# Patient Record
Sex: MALE | Race: WHITE
[De-identification: names, ages, dates, MRNs, and addresses within clinical notes are randomized per-mention and may not be internally consistent; named-entity substitution may affect disease eponyms.]

---

## 2020-01-29 PROBLEM — Z00.00 ENCOUNTER FOR PREVENTIVE HEALTH EXAMINATION: Status: ACTIVE | Noted: 2020-01-29

## 2020-01-30 ENCOUNTER — APPOINTMENT (OUTPATIENT)
Dept: HEMATOLOGY ONCOLOGY | Facility: CLINIC | Age: 35
End: 2020-01-30
Payer: COMMERCIAL

## 2020-01-30 VITALS
HEIGHT: 65 IN | SYSTOLIC BLOOD PRESSURE: 125 MMHG | TEMPERATURE: 98.3 F | DIASTOLIC BLOOD PRESSURE: 62 MMHG | RESPIRATION RATE: 18 BRPM | OXYGEN SATURATION: 98 % | BODY MASS INDEX: 20.66 KG/M2 | WEIGHT: 124 LBS | HEART RATE: 100 BPM

## 2020-01-30 DIAGNOSIS — Z78.9 OTHER SPECIFIED HEALTH STATUS: ICD-10-CM

## 2020-01-30 DIAGNOSIS — Z80.1 FAMILY HISTORY OF MALIGNANT NEOPLASM OF TRACHEA, BRONCHUS AND LUNG: ICD-10-CM

## 2020-01-30 DIAGNOSIS — Z80.7 FAMILY HISTORY OF OTHER MALIGNANT NEOPLASMS OF LYMPHOID, HEMATOPOIETIC AND RELATED TISSUES: ICD-10-CM

## 2020-01-30 DIAGNOSIS — Z80.3 FAMILY HISTORY OF MALIGNANT NEOPLASM OF BREAST: ICD-10-CM

## 2020-01-30 DIAGNOSIS — Z86.19 PERSONAL HISTORY OF OTHER INFECTIOUS AND PARASITIC DISEASES: ICD-10-CM

## 2020-01-30 DIAGNOSIS — F17.200 NICOTINE DEPENDENCE, UNSPECIFIED, UNCOMPLICATED: ICD-10-CM

## 2020-01-30 DIAGNOSIS — Z80.42 FAMILY HISTORY OF MALIGNANT NEOPLASM OF PROSTATE: ICD-10-CM

## 2020-01-30 PROCEDURE — 99205 OFFICE O/P NEW HI 60 MIN: CPT

## 2020-02-05 ENCOUNTER — APPOINTMENT (OUTPATIENT)
Dept: HEMATOLOGY ONCOLOGY | Facility: CLINIC | Age: 35
End: 2020-02-05
Payer: COMMERCIAL

## 2020-02-05 VITALS
OXYGEN SATURATION: 97 % | WEIGHT: 125 LBS | SYSTOLIC BLOOD PRESSURE: 114 MMHG | HEART RATE: 100 BPM | TEMPERATURE: 98.2 F | BODY MASS INDEX: 20.83 KG/M2 | RESPIRATION RATE: 18 BRPM | DIASTOLIC BLOOD PRESSURE: 69 MMHG | HEIGHT: 65 IN

## 2020-02-05 PROCEDURE — 99214 OFFICE O/P EST MOD 30 MIN: CPT

## 2020-02-11 ENCOUNTER — APPOINTMENT (OUTPATIENT)
Dept: HEMATOLOGY ONCOLOGY | Facility: CLINIC | Age: 35
End: 2020-02-11
Payer: COMMERCIAL

## 2020-02-11 VITALS
DIASTOLIC BLOOD PRESSURE: 72 MMHG | SYSTOLIC BLOOD PRESSURE: 126 MMHG | RESPIRATION RATE: 18 BRPM | HEIGHT: 65 IN | OXYGEN SATURATION: 97 % | BODY MASS INDEX: 20.49 KG/M2 | WEIGHT: 123 LBS | TEMPERATURE: 99.6 F | HEART RATE: 117 BPM

## 2020-02-11 PROCEDURE — 99214 OFFICE O/P EST MOD 30 MIN: CPT

## 2020-02-12 NOTE — HISTORY OF PRESENT ILLNESS
[de-identified] : This is a very pleasant 35-year-old gentleman with the below past medical history who was found to have clubbing and therefore was referred for pulmonary workup.  He did report a 30 pound weight loss over the last 2 years. He has had waxing and waning lymph node enlargement particularly the left axilla and had been seen by another hematologist. Given the waxing and waning nature it was advised that this be observed.  He was seen by Dr. Kitchen of pulmonary who felt that the above findings were consistent with a lymphoproliferative disorder. Arrangements were made for him to undergo a left axillary lymph node biopsy performed by Dr. Wallace on 1/24/2020. 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  The tumor cells were found to be positive for CD30, weakly positive for CD15, weakly positive for PAX-5, and Fascin.  In situ hybridization probes for GRACE were negative.  The biopsy sample tested negative for OCT-2, CD45, CD 79a, CD20, CD3, and BOB1.   He is now here to establish hematological/oncological care and further management recommendations. [de-identified] : Here today for an initial consultation for lymphoma referred by Dr. Kitchen. He is in the company of his father.  [0 - No Distress] : Distress Level: 0

## 2020-02-12 NOTE — ASSESSMENT
[FreeTextEntry1] : This is a very pleasant 35-year-old gentleman with a newly diagnosed nodular sclerosing classic Hodgkin lymphoma.  Clinically at this point he appears to be at least stage IIIB given the findings of lymphadenopathy on both sides of diaphragm.  Other laboratories also reveal anemia therefore concerning the for bone marrow involvement, and possibly upstaging to stage IV. I will obtain a PET for more accurate staging as well as a baseline for future PET directed therapy.   I will also obtain a bone marrow biopsy to determine if there was bone marrow involvement. Other hematologic workup is being sent at today's office visit.  I will obtain hepatitis B and C testing as well as HIV testing.  I have also discussed the possibility of infertility secondary to chemotherapy administration and have counseled him on sperm banking. He reports to me that he is not interested in undergoing this. I have asked him to take some time and consider this prior to making a decision.  He will also likely require pulmonary function testing and cardiology clearance for anticipated bleomycin and anthracycline chemotherapy use, respectively.  I have also counseled him on smoking cessation and have strongly encouraged him to do this and provide a plan at today's office visit.  Return in one week for followup and further recommendations.\par \par Thank you very much for allowing me to participate in this gentleman's medical care. Should you have any questions or concerns please do not hesitate to call me directly.

## 2020-02-12 NOTE — PHYSICAL EXAM
[Fully active, able to carry on all pre-disease performance without restriction] : Status 0 - Fully active, able to carry on all pre-disease performance without restriction [Normal] : affect appropriate [de-identified] : Left axillary lymphadenopathy noted.  An approximately 2 cm right inguinal lymph node was appreciated.

## 2020-02-12 NOTE — REVIEW OF SYSTEMS
[Night Sweats] : night sweats [Cough] : cough [Swollen Glands] : swollen glands [Negative] : Neurological [FreeTextEntry2] : 30lb weight loss over the past year.  [de-identified] : difficulty sleeping [de-identified] : right groin [FreeTextEntry1] : cats, melon

## 2020-02-12 NOTE — PHYSICAL EXAM
[Fully active, able to carry on all pre-disease performance without restriction] : Status 0 - Fully active, able to carry on all pre-disease performance without restriction [Normal] : affect appropriate [de-identified] : Left axillary lymphadenopathy noted.  An approximately 2 cm right inguinal lymph node was appreciated. [de-identified] : L

## 2020-02-12 NOTE — ASSESSMENT
[FreeTextEntry1] : This is a very pleasant 35-year-old gentleman with a newly diagnosed nodular sclerosing classic Hodgkin lymphoma.  Clinically at this point he appears to be at least stage IIIB given the findings of lymphadenopathy on both sides of diaphragm.  Other laboratories also reveal anemia therefore concerning the for bone marrow involvement, and possibly upstaging to stage IV.  PET scan performed on 1/31/2020 revealed abnormal bilateral cervical FDG avid lymphadenopathy, abnormal FDG avid adenopathy seen at the thoracic inlet/supraclavicular region bilaterally, enlarged FDG avid left axillary lymphadenopathy with extensive mediastinal and bilateral hilar abnormal FDG avid lymphadenopathy, right femoral and inguinal abnormal FDG avid hypermetabolic lymph nodes.\par I will also obtain a bone marrow biopsy to determine if there was bone marrow involvement.  I have discussed the possibility of infertility secondary to chemotherapy administration and have counseled him on sperm banking. He reports to me that he has  thought about it and is not interested in undergoing this.   He will require pulmonary function testing and cardiology clearance for anticipated bleomycin and anthracycline chemotherapy use, respectively.  He has been scheduled for port placement on Friday with Dr. Wallace.  I have counseled him on smoking cessation and have strongly encouraged him to do this and provide a plan at today's office visit.  Return in one week for followup and further recommendations.

## 2020-02-12 NOTE — REVIEW OF SYSTEMS
[Night Sweats] : night sweats [Cough] : cough [Swollen Glands] : swollen glands [Negative] : Neurological [FreeTextEntry2] : 30lb weight loss over the past year.  [de-identified] : difficulty sleeping [de-identified] : right groin [FreeTextEntry1] : cats, melon

## 2020-02-12 NOTE — HISTORY OF PRESENT ILLNESS
[de-identified] : This is a very pleasant 35-year-old gentleman with the below past medical history who was found to have clubbing and therefore was referred for pulmonary workup.  He did report a 30 pound weight loss over the last 2 years. He has had waxing and waning lymph node enlargement particularly the left axilla and had been seen by another hematologist. Given the waxing and waning nature it was advised that this be observed.  He was seen by Dr. Kitchen of pulmonary who felt that the above findings were consistent with a lymphoproliferative disorder. Arrangements were made for him to undergo a left axillary lymph node biopsy performed by Dr. Wallace on 1/24/2020. 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  The tumor cells were found to be positive for CD30, weakly positive for CD15, weakly positive for PAX-5, and Fascin.  In situ hybridization probes for GRACE were negative.  The biopsy sample tested negative for OCT-2, CD45, CD 79a, CD20, CD3, and BOB1.   He is now here to establish hematological/oncological care and further management recommendations. [de-identified] : Here today for an initial consultation for lymphoma referred by Dr. Kitchen. He is in the company of his father.  [0 - No Distress] : Distress Level: 0

## 2020-02-20 ENCOUNTER — APPOINTMENT (OUTPATIENT)
Dept: HEMATOLOGY ONCOLOGY | Facility: CLINIC | Age: 35
End: 2020-02-20

## 2020-02-25 ENCOUNTER — APPOINTMENT (OUTPATIENT)
Dept: HEMATOLOGY ONCOLOGY | Facility: CLINIC | Age: 35
End: 2020-02-25
Payer: COMMERCIAL

## 2020-02-25 VITALS
HEART RATE: 117 BPM | DIASTOLIC BLOOD PRESSURE: 65 MMHG | BODY MASS INDEX: 20.83 KG/M2 | HEIGHT: 64.96 IN | TEMPERATURE: 98.8 F | WEIGHT: 125 LBS | SYSTOLIC BLOOD PRESSURE: 119 MMHG | RESPIRATION RATE: 18 BRPM | OXYGEN SATURATION: 97 %

## 2020-02-25 PROCEDURE — 99214 OFFICE O/P EST MOD 30 MIN: CPT

## 2020-02-25 NOTE — REVIEW OF SYSTEMS
[Night Sweats] : night sweats [Cough] : cough [Swollen Glands] : swollen glands [Negative] : Respiratory [FreeTextEntry2] : 30lb weight loss over the past year.  [de-identified] : difficulty sleeping [de-identified] : right groin [FreeTextEntry1] : cats, melon

## 2020-02-25 NOTE — ASSESSMENT
[FreeTextEntry1] : This is a very pleasant 35-year-old gentleman with a newly diagnosed nodular sclerosing classic Hodgkin lymphoma.  Clinically at this point he appears to be at least stage IIIB given the findings of lymphadenopathy on both sides of diaphragm.  Other laboratories also reveal anemia therefore concerning the for bone marrow involvement, and possibly upstaging to stage IV.  PET scan performed on 1/31/2020 revealed abnormal bilateral cervical FDG avid lymphadenopathy, abnormal FDG avid adenopathy seen at the thoracic inlet/supraclavicular region bilaterally, enlarged FDG avid left axillary lymphadenopathy with extensive mediastinal and bilateral hilar abnormal FDG avid lymphadenopathy, right femoral and inguinal abnormal FDG avid hypermetabolic lymph nodes.\par I will also obtain a bone marrow biopsy to determine if there was bone marrow involvement.  I have discussed the possibility of infertility secondary to chemotherapy administration and have counseled him on sperm banking. He reports to me that he has  thought about it and is not interested in undergoing this.   I have spoken to Dr. Kitchen and he is clearing him for bleomycin use.   He has also been cleared by cardiology for anthracycline use.  I have counseled him on smoking cessation and have strongly encouraged him to do this and provide a plan at today's office visit.  Return in one week for followup and labs with C#1 starting today.  The benefits/risks and side effects were d/w the patient and he wishes to proceed.  Literature on the chemotherapeutic agents was provided.  He will be given allopurinol in the infusion center as he did not take it as instructed to do so.

## 2020-02-25 NOTE — HISTORY OF PRESENT ILLNESS
[0 - No Distress] : Distress Level: 0 [de-identified] : This is a very pleasant 35-year-old gentleman with the below past medical history who was found to have clubbing and therefore was referred for pulmonary workup.  He did report a 30 pound weight loss over the last 2 years. He has had waxing and waning lymph node enlargement particularly the left axilla and had been seen by another hematologist. Given the waxing and waning nature it was advised that this be observed.  He was seen by Dr. Kitchen of pulmonary who felt that the above findings were consistent with a lymphoproliferative disorder. Arrangements were made for him to undergo a left axillary lymph node biopsy performed by Dr. Wallace on 1/24/2020. 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  The tumor cells were found to be positive for CD30, weakly positive for CD15, weakly positive for PAX-5, and Fascin.  In situ hybridization probes for GRACE were negative.  The biopsy sample tested negative for OCT-2, CD45, CD 79a, CD20, CD3, and BOB1.   He is now here to establish hematological/oncological care and further management recommendations. [de-identified] : Here today for a follow up visit. Complains of pain to his left chest radiating laterally. He is due for treatment today.

## 2020-02-25 NOTE — PHYSICAL EXAM
[Fully active, able to carry on all pre-disease performance without restriction] : Status 0 - Fully active, able to carry on all pre-disease performance without restriction [Normal] : grossly intact [de-identified] : Left axillary lymphadenopathy noted.  An approximately 2 cm right inguinal lymph node was appreciated.

## 2020-03-03 ENCOUNTER — APPOINTMENT (OUTPATIENT)
Dept: HEMATOLOGY ONCOLOGY | Facility: CLINIC | Age: 35
End: 2020-03-03
Payer: COMMERCIAL

## 2020-03-03 VITALS
WEIGHT: 125 LBS | DIASTOLIC BLOOD PRESSURE: 71 MMHG | HEART RATE: 100 BPM | OXYGEN SATURATION: 97 % | TEMPERATURE: 98.2 F | RESPIRATION RATE: 18 BRPM | SYSTOLIC BLOOD PRESSURE: 121 MMHG | HEIGHT: 64.96 IN | BODY MASS INDEX: 20.83 KG/M2

## 2020-03-03 PROCEDURE — 99214 OFFICE O/P EST MOD 30 MIN: CPT

## 2020-03-03 NOTE — PHYSICAL EXAM
[Fully active, able to carry on all pre-disease performance without restriction] : Status 0 - Fully active, able to carry on all pre-disease performance without restriction [Normal] : affect appropriate [de-identified] : Left axillary lymphadenopathy noted.  An approximately 2 cm right inguinal lymph node was appreciated.

## 2020-03-03 NOTE — ASSESSMENT
[FreeTextEntry1] : This is a very pleasant 35-year-old gentleman with a newly diagnosed nodular sclerosing classic Hodgkin lymphoma.  Clinically at this point he appears to be at least stage IIIB given the findings of lymphadenopathy on both sides of diaphragm.  Other laboratories also reveal anemia therefore concerning the for bone marrow involvement, and possibly upstaging to stage IV.  PET scan performed on 1/31/2020 revealed abnormal bilateral cervical FDG avid lymphadenopathy, abnormal FDG avid adenopathy seen at the thoracic inlet/supraclavicular region bilaterally, enlarged FDG avid left axillary lymphadenopathy with extensive mediastinal and bilateral hilar abnormal FDG avid lymphadenopathy, right femoral and inguinal abnormal FDG avid hypermetabolic lymph nodes.\par I will also obtain a bone marrow biopsy to determine if there was bone marrow involvement.  I have discussed the possibility of infertility secondary to chemotherapy administration and have counseled him on sperm banking. He reports to me that he has  thought about it and is not interested in undergoing this.   I have spoken to Dr. Kitchen and he is clearing him for bleomycin use.   He has also been cleared by cardiology for anthracycline use.  I have counseled him on smoking cessation and have strongly encouraged him to do this and provide a plan at today's office visit.  Return in one week for followup and labs with C#1 D#15 due then.  Tolerating treatment well.

## 2020-03-03 NOTE — REVIEW OF SYSTEMS
[Night Sweats] : night sweats [Swollen Glands] : swollen glands [Cough] : cough [Negative] : Constitutional [FreeTextEntry2] : one episode of night sweats first night after chemo. [de-identified] : difficulty sleeping [FreeTextEntry6] : improved [FreeTextEntry7] : mild nausea, relieved by zofran, now resolved.  [FreeTextEntry1] : cats, melon [de-identified] : right groin

## 2020-03-03 NOTE — HISTORY OF PRESENT ILLNESS
[0 - No Distress] : Distress Level: 0 [de-identified] : This is a very pleasant 35-year-old gentleman with the below past medical history who was found to have clubbing and therefore was referred for pulmonary workup.  He did report a 30 pound weight loss over the last 2 years. He has had waxing and waning lymph node enlargement particularly the left axilla and had been seen by another hematologist. Given the waxing and waning nature it was advised that this be observed.  He was seen by Dr. Kitchen of pulmonary who felt that the above findings were consistent with a lymphoproliferative disorder. Arrangements were made for him to undergo a left axillary lymph node biopsy performed by Dr. Wallace on 1/24/2020. 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  The tumor cells were found to be positive for CD30, weakly positive for CD15, weakly positive for PAX-5, and Fascin.  In situ hybridization probes for GRACE were negative.  The biopsy sample tested negative for OCT-2, CD45, CD 79a, CD20, CD3, and BOB1.   He is now here to f/u with labs after having started ABVD chemotherapy on 2/25/2020. [de-identified] : Here today for a follow up visit, lymphoma. Due Tuesday for treatment. Feels well today.

## 2020-03-09 ENCOUNTER — APPOINTMENT (OUTPATIENT)
Dept: HEMATOLOGY ONCOLOGY | Facility: CLINIC | Age: 35
End: 2020-03-09
Payer: COMMERCIAL

## 2020-03-09 VITALS
TEMPERATURE: 97.6 F | HEART RATE: 83 BPM | RESPIRATION RATE: 18 BRPM | BODY MASS INDEX: 21.99 KG/M2 | WEIGHT: 132 LBS | HEIGHT: 64.96 IN | OXYGEN SATURATION: 99 % | SYSTOLIC BLOOD PRESSURE: 129 MMHG | DIASTOLIC BLOOD PRESSURE: 71 MMHG

## 2020-03-09 PROCEDURE — 99214 OFFICE O/P EST MOD 30 MIN: CPT

## 2020-03-09 NOTE — REVIEW OF SYSTEMS
[Cough] : cough [Swollen Glands] : swollen glands [Recent Change In Weight] : ~T recent weight change [Negative] : Heme/Lymph [FreeTextEntry2] : 7 lb weight gain, purposeful [FreeTextEntry6] : improved [FreeTextEntry7] : mild stomach ache following treatment, now resolved, zofran prn [de-identified] : improved sleep [de-identified] : significantly reduced  [FreeTextEntry1] : cats, melon

## 2020-03-09 NOTE — PHYSICAL EXAM
[Fully active, able to carry on all pre-disease performance without restriction] : Status 0 - Fully active, able to carry on all pre-disease performance without restriction [Normal] : affect appropriate [de-identified] : Prior left axillary lymphadenopathy and 2 cm right inguinal lymph node almost unappreciable.

## 2020-03-09 NOTE — ASSESSMENT
[FreeTextEntry1] : This is a very pleasant 35-year-old gentleman with a newly diagnosed nodular sclerosing classic Hodgkin lymphoma.  Clinically at this point he appears to be at least stage IIIB given the findings of lymphadenopathy on both sides of diaphragm.  Other laboratories also reveal anemia therefore concerning the for bone marrow involvement, and possibly upstaging to stage IV.  PET scan performed on 1/31/2020 revealed abnormal bilateral cervical FDG avid lymphadenopathy, abnormal FDG avid adenopathy seen at the thoracic inlet/supraclavicular region bilaterally, enlarged FDG avid left axillary lymphadenopathy with extensive mediastinal and bilateral hilar abnormal FDG avid lymphadenopathy, right femoral and inguinal abnormal FDG avid hypermetabolic lymph nodes.\par I will also obtain a bone marrow biopsy to determine if there was bone marrow involvement.  I have discussed the possibility of infertility secondary to chemotherapy administration and have counseled him on sperm banking. He reports to me that he has  thought about it and is not interested in undergoing this.   I have spoken to Dr. Kitchen and he is clearing him for bleomycin use.   He has also been cleared by cardiology for anthracycline use.  I have counseled him on smoking cessation and have strongly encouraged him to do this.   Return in one week for followup and labs with C#1 D#15 due tomorrow.  Tolerating treatment well.

## 2020-03-09 NOTE — HISTORY OF PRESENT ILLNESS
[0 - No Distress] : Distress Level: 0 [de-identified] : This is a very pleasant 35-year-old gentleman with the below past medical history who was found to have clubbing and therefore was referred for pulmonary workup.  He did report a 30 pound weight loss over the last 2 years. He has had waxing and waning lymph node enlargement particularly the left axilla and had been seen by another hematologist. Given the waxing and waning nature it was advised that this be observed.  He was seen by Dr. Kitchen of pulmonary who felt that the above findings were consistent with a lymphoproliferative disorder. Arrangements were made for him to undergo a left axillary lymph node biopsy performed by Dr. Wallace on 1/24/2020. 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  The tumor cells were found to be positive for CD30, weakly positive for CD15, weakly positive for PAX-5, and Fascin.  In situ hybridization probes for GRACE were negative.  The biopsy sample tested negative for OCT-2, CD45, CD 79a, CD20, CD3, and BOB1.   He is now here to f/u with labs after having started ABVD chemotherapy on 2/25/2020. [de-identified] : Here today for a follow up visit, lymphoma. Due tomorrow for treatment. Offers no new complaints. 7 lb purposeful weight gain.

## 2020-03-17 ENCOUNTER — APPOINTMENT (OUTPATIENT)
Dept: HEMATOLOGY ONCOLOGY | Facility: CLINIC | Age: 35
End: 2020-03-17
Payer: COMMERCIAL

## 2020-03-17 VITALS
TEMPERATURE: 98.4 F | RESPIRATION RATE: 18 BRPM | WEIGHT: 136 LBS | BODY MASS INDEX: 22.66 KG/M2 | DIASTOLIC BLOOD PRESSURE: 71 MMHG | OXYGEN SATURATION: 98 % | HEIGHT: 64.96 IN | SYSTOLIC BLOOD PRESSURE: 116 MMHG | HEART RATE: 97 BPM

## 2020-03-17 PROCEDURE — 99214 OFFICE O/P EST MOD 30 MIN: CPT

## 2020-03-17 NOTE — ASSESSMENT
[FreeTextEntry1] : This is a very pleasant 35-year-old gentleman with a newly diagnosed nodular sclerosing classic Hodgkin lymphoma.  Clinically at this point he appears to be at least stage IIIB given the findings of lymphadenopathy on both sides of diaphragm.  Other laboratories also reveal anemia therefore concerning the for bone marrow involvement, and possibly upstaging to stage IV.  PET scan performed on 1/31/2020 revealed abnormal bilateral cervical FDG avid lymphadenopathy, abnormal FDG avid adenopathy seen at the thoracic inlet/supraclavicular region bilaterally, enlarged FDG avid left axillary lymphadenopathy with extensive mediastinal and bilateral hilar abnormal FDG avid lymphadenopathy, right femoral and inguinal abnormal FDG avid hypermetabolic lymph nodes.\par I will also obtain a bone marrow biopsy to determine if there was bone marrow involvement.  I have discussed the possibility of infertility secondary to chemotherapy administration and have counseled him on sperm banking. He reports to me that he has  thought about it and is not interested in undergoing this.   I have spoken to Dr. Kitchen and he is clearing him for bleomycin use.   He has also been cleared by cardiology for anthracycline use.  I have counseled him on smoking cessation and have strongly encouraged him to do this.   Return in one week for followup and labs and to start C#2.  Tolerating treatment well.

## 2020-03-17 NOTE — HISTORY OF PRESENT ILLNESS
[0 - No Distress] : Distress Level: 0 [de-identified] : This is a very pleasant 35-year-old gentleman with the below past medical history who was found to have clubbing and therefore was referred for pulmonary workup.  He did report a 30 pound weight loss over the last 2 years. He has had waxing and waning lymph node enlargement particularly the left axilla and had been seen by another hematologist. Given the waxing and waning nature it was advised that this be observed.  He was seen by Dr. Kitchen of pulmonary who felt that the above findings were consistent with a lymphoproliferative disorder. Arrangements were made for him to undergo a left axillary lymph node biopsy performed by Dr. Wallace on 1/24/2020. 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  The tumor cells were found to be positive for CD30, weakly positive for CD15, weakly positive for PAX-5, and Fascin.  In situ hybridization probes for GRACE were negative.  The biopsy sample tested negative for OCT-2, CD45, CD 79a, CD20, CD3, and BOB1.   He is now here to f/u with labs after having started ABVD chemotherapy on 2/25/2020. [de-identified] : Here today for a follow up visit, lymphoma. Due next week for treatment. Feels well, no new complaints offered.

## 2020-03-17 NOTE — PHYSICAL EXAM
[Fully active, able to carry on all pre-disease performance without restriction] : Status 0 - Fully active, able to carry on all pre-disease performance without restriction [Normal] : affect appropriate [de-identified] : Prior left axillary lymphadenopathy and 2 cm right inguinal lymph node almost unappreciable.

## 2020-03-17 NOTE — REVIEW OF SYSTEMS
[Recent Change In Weight] : ~T recent weight change [Swollen Glands] : swollen glands [Negative] : Heme/Lymph [FreeTextEntry2] : 4 lb weight gain, purposeful [FreeTextEntry6] : cough resolved [FreeTextEntry7] : good appetite. [de-identified] :  reduced  [FreeTextEntry1] : cats, melon

## 2020-03-24 ENCOUNTER — APPOINTMENT (OUTPATIENT)
Dept: HEMATOLOGY ONCOLOGY | Facility: CLINIC | Age: 35
End: 2020-03-24
Payer: COMMERCIAL

## 2020-03-24 VITALS
HEART RATE: 83 BPM | HEIGHT: 64.96 IN | RESPIRATION RATE: 18 BRPM | TEMPERATURE: 98.2 F | OXYGEN SATURATION: 99 % | DIASTOLIC BLOOD PRESSURE: 75 MMHG | BODY MASS INDEX: 22.49 KG/M2 | SYSTOLIC BLOOD PRESSURE: 117 MMHG | WEIGHT: 135 LBS

## 2020-03-24 PROCEDURE — 99214 OFFICE O/P EST MOD 30 MIN: CPT

## 2020-03-24 NOTE — HISTORY OF PRESENT ILLNESS
[0 - No Distress] : Distress Level: 0 [de-identified] : This is a very pleasant 35-year-old gentleman with the below past medical history who was found to have clubbing and therefore was referred for pulmonary workup.  He did report a 30 pound weight loss over the last 2 years. He has had waxing and waning lymph node enlargement particularly the left axilla and had been seen by another hematologist. Given the waxing and waning nature it was advised that this be observed.  He was seen by Dr. Kitchen of pulmonary who felt that the above findings were consistent with a lymphoproliferative disorder. Arrangements were made for him to undergo a left axillary lymph node biopsy performed by Dr. Wallace on 1/24/2020. 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  The tumor cells were found to be positive for CD30, weakly positive for CD15, weakly positive for PAX-5, and Fascin.  In situ hybridization probes for GRACE were negative.  The biopsy sample tested negative for OCT-2, CD45, CD 79a, CD20, CD3, and BOB1.   He is now here to f/u with labs after having started ABVD chemotherapy on 2/25/2020. [de-identified] : Here today for a follow up visit, lymphoma. Offers no new complaints, due today for treatment following OV.

## 2020-03-24 NOTE — ASSESSMENT
[FreeTextEntry1] : This is a very pleasant 35-year-old gentleman with a newly diagnosed nodular sclerosing classic Hodgkin lymphoma.  Clinically at this point he appears to be at least stage IIIB given the findings of lymphadenopathy on both sides of diaphragm.  Other laboratories also reveal anemia therefore concerning the for bone marrow involvement, and possibly upstaging to stage IV.  PET scan performed on 1/31/2020 revealed abnormal bilateral cervical FDG avid lymphadenopathy, abnormal FDG avid adenopathy seen at the thoracic inlet/supraclavicular region bilaterally, enlarged FDG avid left axillary lymphadenopathy with extensive mediastinal and bilateral hilar abnormal FDG avid lymphadenopathy, right femoral and inguinal abnormal FDG avid hypermetabolic lymph nodes.\par I will also obtain a bone marrow biopsy to determine if there was bone marrow involvement.  I have discussed the possibility of infertility secondary to chemotherapy administration and have counseled him on sperm banking. He reports to me that he has  thought about it and is not interested in undergoing this.   I have spoken to Dr. Kitchen and he is clearing him for bleomycin use.   He has also been cleared by cardiology for anthracycline use.  I have counseled him on smoking cessation and have strongly encouraged him to do this.   Return in one week for followup and labs.  start C#2 today.  Tolerating treatment well.

## 2020-03-24 NOTE — PHYSICAL EXAM
[Fully active, able to carry on all pre-disease performance without restriction] : Status 0 - Fully active, able to carry on all pre-disease performance without restriction [Normal] : affect appropriate [de-identified] : Prior left axillary lymphadenopathy and 2 cm right inguinal lymph node almost unappreciable.

## 2020-03-24 NOTE — REVIEW OF SYSTEMS
[Recent Change In Weight] : ~T recent weight change [Swollen Glands] : swollen glands [Negative] : Heme/Lymph [FreeTextEntry2] : 4 lb weight gain, purposeful [FreeTextEntry5] : R PAC [FreeTextEntry7] : good appetite. [de-identified] :  reduced  [FreeTextEntry1] : cats, melon

## 2020-03-31 ENCOUNTER — APPOINTMENT (OUTPATIENT)
Dept: HEMATOLOGY ONCOLOGY | Facility: CLINIC | Age: 35
End: 2020-03-31
Payer: COMMERCIAL

## 2020-03-31 ENCOUNTER — APPOINTMENT (OUTPATIENT)
Dept: HEMATOLOGY ONCOLOGY | Facility: CLINIC | Age: 35
End: 2020-03-31

## 2020-03-31 VITALS
HEART RATE: 81 BPM | DIASTOLIC BLOOD PRESSURE: 67 MMHG | SYSTOLIC BLOOD PRESSURE: 113 MMHG | RESPIRATION RATE: 18 BRPM | WEIGHT: 135 LBS | TEMPERATURE: 97.2 F | HEIGHT: 65.35 IN | BODY MASS INDEX: 22.22 KG/M2 | OXYGEN SATURATION: 100 %

## 2020-03-31 DIAGNOSIS — R11.0 NAUSEA: ICD-10-CM

## 2020-03-31 PROCEDURE — 99214 OFFICE O/P EST MOD 30 MIN: CPT

## 2020-03-31 NOTE — REVIEW OF SYSTEMS
[Swollen Glands] : swollen glands [Negative] : Allergic/Immunologic [Recent Change In Weight] : ~T no recent weight change [FreeTextEntry5] : R PAC [FreeTextEntry7] : nausea,good appetite. [de-identified] :  reduced  [FreeTextEntry1] : cats, melon

## 2020-03-31 NOTE — HISTORY OF PRESENT ILLNESS
[0 - No Distress] : Distress Level: 0 [Therapy: ___] : Therapy: [unfilled] [Cycle: ___] : Cycle: [unfilled] [Day: ___] : Day: [unfilled] [de-identified] : This is a very pleasant 35-year-old gentleman with the below past medical history who was found to have clubbing and therefore was referred for pulmonary workup.  He did report a 30 pound weight loss over the last 2 years. He has had waxing and waning lymph node enlargement particularly the left axilla and had been seen by another hematologist. Given the waxing and waning nature it was advised that this be observed.  He was seen by Dr. Kitchen of pulmonary who felt that the above findings were consistent with a lymphoproliferative disorder. Arrangements were made for him to undergo a left axillary lymph node biopsy performed by Dr. Wallace on 1/24/2020. 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  The tumor cells were found to be positive for CD30, weakly positive for CD15, weakly positive for PAX-5, and Fascin.  In situ hybridization probes for GRACE were negative.  The biopsy sample tested negative for OCT-2, CD45, CD 79a, CD20, CD3, and BOB1.   He is now here to f/u with labs after having started ABVD chemotherapy on 2/25/2020. [FreeTextEntry1] : ABVD c1d1 2/25,c1d15 3/10 c 2 day1 3/24/20 [de-identified] : He presents for follow up cycle 2 day 8  ABVD.  He is tolerating it well except he experiences nausea days 3,4.for which he has been using Ondansetron followed by Compazine. .Otherwise he feels well.  He denies rash, fever, infections, bleeding, bruising, nausea,vomiting,cough, SOB, chest pain, change in BM, headache or dizziness.

## 2020-03-31 NOTE — PHYSICAL EXAM
[Fully active, able to carry on all pre-disease performance without restriction] : Status 0 - Fully active, able to carry on all pre-disease performance without restriction [Normal] : affect appropriate [de-identified] : Prior left axillary lymphadenopathy and 2 cm right inguinal lymph node almost unappreciable.

## 2020-03-31 NOTE — ASSESSMENT
[FreeTextEntry1] : This is a very pleasant 35-year-old gentleman with a newly diagnosed nodular sclerosing classic Hodgkin lymphoma.  Clinically at this point he appears to be at least stage IIIB given the findings of lymphadenopathy on both sides of diaphragm.  Other laboratories also reveal anemia therefore concerning the for bone marrow involvement, and possibly upstaging to stage IV.  PET scan performed on 1/31/2020 revealed abnormal bilateral cervical FDG avid lymphadenopathy, abnormal FDG avid adenopathy seen at the thoracic inlet/supraclavicular region bilaterally, enlarged FDG avid left axillary lymphadenopathy with extensive mediastinal and bilateral hilar abnormal FDG avid lymphadenopathy, right femoral and inguinal abnormal FDG avid hypermetabolic lymph nodes.\par I will also obtain a bone marrow biopsy to determine if there was bone marrow involvement.  I have discussed the possibility of infertility secondary to chemotherapy administration and have counseled him on sperm banking. He reports to me that he has  thought about it and is not interested in undergoing this.   I have spoken to Dr. Kitchen and he is clearing him for bleomycin use.   He has also been cleared by cardiology for anthracycline use.  I have counseled him on smoking cessation and have strongly encouraged him to do this.   Return in one week for followup and labs  He presents cycle 2 day 8.  He is tolerating therapy well except nausea days 3 and 4. We reviewed antiemetic use.  He may use Prochlorperazine prophylactically on days 3 and 4.  Cycle d 15 next week.  Will order scans to restage at that time. \par History of present illness, review of systems, physical exam and treatment plan reviewed with .\par Office visit in 1 week or prn for new or worsening symptoms. \par

## 2020-04-06 ENCOUNTER — APPOINTMENT (OUTPATIENT)
Dept: HEMATOLOGY ONCOLOGY | Facility: CLINIC | Age: 35
End: 2020-04-06
Payer: COMMERCIAL

## 2020-04-06 VITALS
OXYGEN SATURATION: 100 % | TEMPERATURE: 98 F | HEIGHT: 65.35 IN | HEART RATE: 90 BPM | SYSTOLIC BLOOD PRESSURE: 121 MMHG | RESPIRATION RATE: 18 BRPM | DIASTOLIC BLOOD PRESSURE: 64 MMHG | BODY MASS INDEX: 22.39 KG/M2 | WEIGHT: 136 LBS

## 2020-04-06 DIAGNOSIS — M79.604 PAIN IN RIGHT LEG: ICD-10-CM

## 2020-04-06 DIAGNOSIS — M79.605 PAIN IN RIGHT LEG: ICD-10-CM

## 2020-04-06 PROCEDURE — 99214 OFFICE O/P EST MOD 30 MIN: CPT

## 2020-04-13 ENCOUNTER — APPOINTMENT (OUTPATIENT)
Dept: HEMATOLOGY ONCOLOGY | Facility: CLINIC | Age: 35
End: 2020-04-13
Payer: COMMERCIAL

## 2020-04-13 VITALS
RESPIRATION RATE: 18 BRPM | TEMPERATURE: 97.5 F | WEIGHT: 135 LBS | SYSTOLIC BLOOD PRESSURE: 123 MMHG | HEIGHT: 65.35 IN | DIASTOLIC BLOOD PRESSURE: 65 MMHG | OXYGEN SATURATION: 100 % | HEART RATE: 81 BPM | BODY MASS INDEX: 22.22 KG/M2

## 2020-04-13 PROCEDURE — 99214 OFFICE O/P EST MOD 30 MIN: CPT

## 2020-04-13 NOTE — PHYSICAL EXAM
[Fully active, able to carry on all pre-disease performance without restriction] : Status 0 - Fully active, able to carry on all pre-disease performance without restriction [Normal] : affect appropriate [de-identified] : Prior left axillary lymphadenopathy and 2 cm right inguinal lymph node unappreciable.

## 2020-04-13 NOTE — HISTORY OF PRESENT ILLNESS
[Therapy: ___] : Therapy: [unfilled] [Cycle: ___] : Cycle: [unfilled] [Day: ___] : Day: [unfilled] [0 - No Distress] : Distress Level: 0 [FreeTextEntry1] : ABVD c1d1 2/25 [de-identified] : He presents for follow up cycle 2 day 21  ABVD.  He is tolerating it well except he experiences nausea days 3,4.for which he has been using Ondansetron followed by Compazine.  He has experineced R>L LE pain/tingling for the last 4-5 days.  Otherwise he feels well.  He denies rash, fever, infections, bleeding, bruising, nausea,vomiting,cough, SOB, chest pain, change in BM, headache or dizziness.  [de-identified] : This is a very pleasant 35-year-old gentleman with the below past medical history who was found to have clubbing and therefore was referred for pulmonary workup.  He did report a 30 pound weight loss over the last 2 years. He has had waxing and waning lymph node enlargement particularly the left axilla and had been seen by another hematologist. Given the waxing and waning nature it was advised that this be observed.  He was seen by Dr. Kitchen of pulmonary who felt that the above findings were consistent with a lymphoproliferative disorder. Arrangements were made for him to undergo a left axillary lymph node biopsy performed by Dr. Wallace on 1/24/2020. 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  The tumor cells were found to be positive for CD30, weakly positive for CD15, weakly positive for PAX-5, and Fascin.  In situ hybridization probes for GRACE were negative.  The biopsy sample tested negative for OCT-2, CD45, CD 79a, CD20, CD3, and BOB1.   He is now here to f/u with labs after having started ABVD chemotherapy on 2/25/2020.

## 2020-04-13 NOTE — REVIEW OF SYSTEMS
[Negative] : Allergic/Immunologic [FreeTextEntry5] : R PAC [FreeTextEntry7] : nausea,good appetite. [de-identified] : pain/tingling of the b/l LE ext. [FreeTextEntry1] : cats, melon

## 2020-04-13 NOTE — ASSESSMENT
[FreeTextEntry1] : This is a very pleasant 35-year-old gentleman with a newly diagnosed nodular sclerosing classic Hodgkin lymphoma.  Clinically at this point he appears to be at least stage IIIB given the findings of lymphadenopathy on both sides of diaphragm.  PET scan performed on 1/31/2020 revealed abnormal bilateral cervical FDG avid lymphadenopathy, abnormal FDG avid adenopathy seen at the thoracic inlet/supraclavicular region bilaterally, enlarged FDG avid left axillary lymphadenopathy with extensive mediastinal and bilateral hilar abnormal FDG avid lymphadenopathy, right femoral and inguinal abnormal FDG avid hypermetabolic lymph nodes.\par I also obtained a bone marrow biopsy to determine if there was bone marrow involvement.  I have discussed the possibility of infertility secondary to chemotherapy administration and have counseled him on sperm banking. He reports to me that he has  thought about it and is not interested in undergoing this.   I have spoken to Dr. Kitchen and he cleared him for bleomycin use.   He had also been cleared by cardiology for anthracycline use.  I have counseled him on smoking cessation and have strongly encouraged him to do this.  He presents cycle 2 day 21.  He is tolerating therapy well except nausea days 3 and 4. We reviewed antiemetic use.  He may use Prochlorperazine prophylactically on days 3 and 4.  I obtained a LE doppler given his new onset LE pain which were negative.  Ordered scans to restage at this time, which are scheduled for this Friday. \par Office visit in 1 week or prn for new or worsening symptoms. \par

## 2020-04-20 ENCOUNTER — APPOINTMENT (OUTPATIENT)
Dept: HEMATOLOGY ONCOLOGY | Facility: CLINIC | Age: 35
End: 2020-04-20
Payer: COMMERCIAL

## 2020-04-20 VITALS
SYSTOLIC BLOOD PRESSURE: 138 MMHG | DIASTOLIC BLOOD PRESSURE: 72 MMHG | OXYGEN SATURATION: 99 % | WEIGHT: 138.5 LBS | RESPIRATION RATE: 18 BRPM | BODY MASS INDEX: 22.8 KG/M2 | HEART RATE: 77 BPM | TEMPERATURE: 97.8 F

## 2020-04-20 PROCEDURE — 99214 OFFICE O/P EST MOD 30 MIN: CPT

## 2020-04-20 NOTE — PHYSICAL EXAM
[Fully active, able to carry on all pre-disease performance without restriction] : Status 0 - Fully active, able to carry on all pre-disease performance without restriction [Normal] : affect appropriate [de-identified] : Prior left axillary lymphadenopathy and 2 cm right inguinal lymph node unappreciable.

## 2020-04-20 NOTE — HISTORY OF PRESENT ILLNESS
[Therapy: ___] : Therapy: [unfilled] [Cycle: ___] : Cycle: [unfilled] [Day: ___] : Day: [unfilled] [0 - No Distress] : Distress Level: 0 [de-identified] : Here today for a follow up visit, lymphoma, ABVD. He offers no new complaints stating he feels good today. Due tomorrow for treatment. Completed ordered PET Friday afternoon at [facility]. [FreeTextEntry1] : ABVD c1d1 2/25 [de-identified] : This is a very pleasant 35-year-old gentleman with the below past medical history who was found to have clubbing and therefore was referred for pulmonary workup.  He did report a 30 pound weight loss over the last 2 years. He has had waxing and waning lymph node enlargement particularly the left axilla and had been seen by another hematologist. Given the waxing and waning nature it was advised that this be observed.  He was seen by Dr. Kitchen of pulmonary who felt that the above findings were consistent with a lymphoproliferative disorder. Arrangements were made for him to undergo a left axillary lymph node biopsy performed by Dr. Wallace on 1/24/2020. 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  The tumor cells were found to be positive for CD30, weakly positive for CD15, weakly positive for PAX-5, and Fascin.  In situ hybridization probes for GRACE were negative.  The biopsy sample tested negative for OCT-2, CD45, CD 79a, CD20, CD3, and BOB1.   He is now here to f/u with labs after having started ABVD chemotherapy on 2/25/2020.

## 2020-04-20 NOTE — ASSESSMENT
[FreeTextEntry1] : This is a very pleasant 35-year-old gentleman with a newly diagnosed nodular sclerosing classic Hodgkin lymphoma.  Clinically at this point he appears to be at least stage IIIB given the findings of lymphadenopathy on both sides of diaphragm.  PET scan performed on 1/31/2020 revealed abnormal bilateral cervical FDG avid lymphadenopathy, abnormal FDG avid adenopathy seen at the thoracic inlet/supraclavicular region bilaterally, enlarged FDG avid left axillary lymphadenopathy with extensive mediastinal and bilateral hilar abnormal FDG avid lymphadenopathy, right femoral and inguinal abnormal FDG avid hypermetabolic lymph nodes.\par I also obtained a bone marrow biopsy to determine if there was bone marrow involvement.  I have discussed the possibility of infertility secondary to chemotherapy administration and have counseled him on sperm banking. He reports to me that he has  thought about it and is not interested in undergoing this.   I have spoken to Dr. Kitchen and he cleared him for bleomycin use.   He had also been cleared by cardiology for anthracycline use.  I have counseled him on smoking cessation and have strongly encouraged him to do this.  He presents cycle 2 day 28.  He is tolerating therapy well except nausea days 3 and 4. We reviewed antiemetic use.  He may use Prochlorperazine prophylactically on days 3 and 4.  I obtained a LE doppler given his new onset LE pain which were negative.  Repeat PET imaging after cycle #2 showed a significant response with Deauville scores of  2-3.  Therefore he will proceed on with 4 additional cycles with bleomycin being omitted with cycles 3 through 6.\par Office visit in 1 week or prn for new or worsening symptoms. \par

## 2020-04-27 ENCOUNTER — APPOINTMENT (OUTPATIENT)
Dept: HEMATOLOGY ONCOLOGY | Facility: CLINIC | Age: 35
End: 2020-04-27
Payer: COMMERCIAL

## 2020-04-27 VITALS
DIASTOLIC BLOOD PRESSURE: 63 MMHG | HEIGHT: 65.35 IN | SYSTOLIC BLOOD PRESSURE: 137 MMHG | HEART RATE: 85 BPM | BODY MASS INDEX: 22.72 KG/M2 | TEMPERATURE: 98.1 F | WEIGHT: 138 LBS | OXYGEN SATURATION: 99 % | RESPIRATION RATE: 18 BRPM

## 2020-04-27 PROCEDURE — 99214 OFFICE O/P EST MOD 30 MIN: CPT

## 2020-05-04 ENCOUNTER — APPOINTMENT (OUTPATIENT)
Dept: HEMATOLOGY ONCOLOGY | Facility: CLINIC | Age: 35
End: 2020-05-04
Payer: COMMERCIAL

## 2020-05-04 VITALS
BODY MASS INDEX: 23.21 KG/M2 | SYSTOLIC BLOOD PRESSURE: 115 MMHG | DIASTOLIC BLOOD PRESSURE: 65 MMHG | RESPIRATION RATE: 18 BRPM | HEIGHT: 65.35 IN | OXYGEN SATURATION: 97 % | WEIGHT: 141 LBS | HEART RATE: 93 BPM | TEMPERATURE: 98.5 F

## 2020-05-04 PROCEDURE — 99214 OFFICE O/P EST MOD 30 MIN: CPT

## 2020-05-04 NOTE — HISTORY OF PRESENT ILLNESS
[Therapy: ___] : Therapy: [unfilled] [Cycle: ___] : Cycle: [unfilled] [Day: ___] : Day: [unfilled] [0 - No Distress] : Distress Level: 0 [de-identified] : This is a very pleasant 35-year-old gentleman with the below past medical history who was found to have clubbing and therefore was referred for pulmonary workup.  He did report a 30 pound weight loss over the last 2 years. He has had waxing and waning lymph node enlargement particularly the left axilla and had been seen by another hematologist. Given the waxing and waning nature it was advised that this be observed.  He was seen by Dr. Kitchen of pulmonary who felt that the above findings were consistent with a lymphoproliferative disorder. Arrangements were made for him to undergo a left axillary lymph node biopsy performed by Dr. Wallace on 1/24/2020. 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  The tumor cells were found to be positive for CD30, weakly positive for CD15, weakly positive for PAX-5, and Fascin.  In situ hybridization probes for GRACE were negative.  The biopsy sample tested negative for OCT-2, CD45, CD 79a, CD20, CD3, and BOB1.   He is now here to f/u with labs after having started ABVD chemotherapy on 2/25/2020.  Changed to AVD after C#2 due to PET response. [FreeTextEntry1] : ABVD c5 d6 4/21/20, Bleo discontinue after cycle 3.  [de-identified] : Mr. Reynolds presents with h/o Hodgkin's lymphoma. States last treatment was well tolerated with no side effects. Next treatment due tomorrow. Follow up with Dr. Kitchen today via televisit.

## 2020-05-04 NOTE — PHYSICAL EXAM
[Fully active, able to carry on all pre-disease performance without restriction] : Status 0 - Fully active, able to carry on all pre-disease performance without restriction [Normal] : affect appropriate [de-identified] : Prior left axillary lymphadenopathy and 2 cm right inguinal lymph node unappreciable.

## 2020-05-04 NOTE — REVIEW OF SYSTEMS
[Negative] : Allergic/Immunologic [Skin Rash] : skin rash [FreeTextEntry5] : R PAC [FreeTextEntry2] : appetite normalized and is gaining weight. [de-identified] : right arm, and right hand, pt states from using chemicals. [FreeTextEntry1] : cats, melon

## 2020-05-04 NOTE — ASSESSMENT
[FreeTextEntry1] : This is a very pleasant 35-year-old gentleman with a newly diagnosed nodular sclerosing classic Hodgkin lymphoma.  Clinically at this point he appears to be at least stage IIIB given the findings of lymphadenopathy on both sides of diaphragm.  PET scan performed on 1/31/2020 revealed abnormal bilateral cervical FDG avid lymphadenopathy, abnormal FDG avid adenopathy seen at the thoracic inlet/supraclavicular region bilaterally, enlarged FDG avid left axillary lymphadenopathy with extensive mediastinal and bilateral hilar abnormal FDG avid lymphadenopathy, right femoral and inguinal abnormal FDG avid hypermetabolic lymph nodes.\par I also obtained a bone marrow biopsy to determine if there was bone marrow involvement.  I have discussed the possibility of infertility secondary to chemotherapy administration and have counseled him on sperm banking. He reports to me that he has  thought about it and is not interested in undergoing this.   I have spoken to Dr. Kitchen and he cleared him for bleomycin use.   He had also been cleared by cardiology for anthracycline use.  I have counseled him on smoking cessation and have strongly encouraged him to do this. We reviewed antiemetic use.  He may use Prochlorperazine prophylactically on days 3 and 4.  Repeat PET imaging after cycle #2 showed a significant response with Deauville scores of  2-3.  Therefore he proceeded on with 4 additional cycles with bleomycin being omitted with cycles 3 through 6.\par He is s/p cycle#3W#3 and continues to tolerate therapy well.  Reviewed his counts which are stable.  Continue AVD at current dose and schedule. \par Office visit in 1 week or prn for new or worsening symptoms. \par

## 2020-05-11 ENCOUNTER — APPOINTMENT (OUTPATIENT)
Dept: HEMATOLOGY ONCOLOGY | Facility: CLINIC | Age: 35
End: 2020-05-11
Payer: COMMERCIAL

## 2020-05-12 ENCOUNTER — APPOINTMENT (OUTPATIENT)
Dept: HEMATOLOGY ONCOLOGY | Facility: CLINIC | Age: 35
End: 2020-05-12
Payer: COMMERCIAL

## 2020-05-12 VITALS
WEIGHT: 138 LBS | SYSTOLIC BLOOD PRESSURE: 112 MMHG | DIASTOLIC BLOOD PRESSURE: 67 MMHG | HEART RATE: 96 BPM | TEMPERATURE: 97.8 F | RESPIRATION RATE: 18 BRPM | HEIGHT: 65.35 IN | BODY MASS INDEX: 22.72 KG/M2 | OXYGEN SATURATION: 99 %

## 2020-05-12 PROCEDURE — 99213 OFFICE O/P EST LOW 20 MIN: CPT

## 2020-05-12 RX ORDER — ALLOPURINOL 300 MG/1
300 TABLET ORAL DAILY
Qty: 30 | Refills: 0 | Status: DISCONTINUED | COMMUNITY
Start: 2020-02-14 | End: 2020-05-12

## 2020-05-12 NOTE — PHYSICAL EXAM
[Normal] : normal spine exam without palpable tenderness, no kyphosis or scoliosis [de-identified] : Prior left axillary lymphadenopathy and 2 cm right inguinal lymph node unappreciable.

## 2020-05-12 NOTE — ASSESSMENT
[FreeTextEntry1] : This is a very pleasant 35-year-old gentleman with a newly diagnosed nodular sclerosing classic Hodgkin lymphoma.  Clinically at this point he appears to be at least stage IIIB given the findings of lymphadenopathy on both sides of diaphragm.  PET scan performed on 1/31/2020 revealed abnormal bilateral cervical FDG avid lymphadenopathy, abnormal FDG avid adenopathy seen at the thoracic inlet/supraclavicular region bilaterally, enlarged FDG avid left axillary lymphadenopathy with extensive mediastinal and bilateral hilar abnormal FDG avid lymphadenopathy, right femoral and inguinal abnormal FDG avid hypermetabolic lymph nodes.\par I also obtained a bone marrow biopsy to determine if there was bone marrow involvement.  I have discussed the possibility of infertility secondary to chemotherapy administration and have counseled him on sperm banking. He reports to me that he has  thought about it and is not interested in undergoing this.   I have spoken to Dr. Kitchen and he cleared him for bleomycin use.   He had also been cleared by cardiology for anthracycline use.  I have counseled him on smoking cessation and have strongly encouraged him to do this. We reviewed antiemetic use.  He may use Prochlorperazine prophylactically on days 3 and 4.  Repeat PET imaging after cycle #2 showed a significant response with Deauville scores of  2-3.  Therefore he proceeded on with 4 additional cycles with bleomycin being omitted with cycles 3 through 6.\par He continues to receive AVD and is tolerating it well. \par We will repeat CT Chest only in June\par History of present illness, review of systems, physical exam and treatment plan reviewed with .\par Office visit in 1 week or prn for new or worsening symptoms. \par

## 2020-05-12 NOTE — HISTORY OF PRESENT ILLNESS
[de-identified] : Mr. Reynolds presents with h/o Hodgkin's lymphoma, one week after last cycle of AVD. He is tolerating therapy better since discontinuing Bleo with less abdominal discomfort and nausea.  He denies rash, fever, infections, bleeding, bruising, nausea,vomiting,cough, SOB, chest pain, change in BM, headache or dizziness.   [FreeTextEntry1] : ABVD c5 d6 4/21/20, Bleo discontinue after cycle 3., AVD   [de-identified] : This is a very pleasant 35-year-old gentleman with the below past medical history who was found to have clubbing and therefore was referred for pulmonary workup.  He did report a 30 pound weight loss over the last 2 years. He has had waxing and waning lymph node enlargement particularly the left axilla and had been seen by another hematologist. Given the waxing and waning nature it was advised that this be observed.  He was seen by Dr. Kitchen of pulmonary who felt that the above findings were consistent with a lymphoproliferative disorder. Arrangements were made for him to undergo a left axillary lymph node biopsy performed by Dr. Wallace on 1/24/2020. 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  The tumor cells were found to be positive for CD30, weakly positive for CD15, weakly positive for PAX-5, and Fascin.  In situ hybridization probes for GRACE were negative.  The biopsy sample tested negative for OCT-2, CD45, CD 79a, CD20, CD3, and BOB1.   He is now here to f/u with labs after having started ABVD chemotherapy on 2/25/2020.  Changed to AVD after C#2 due to PET response.

## 2020-05-12 NOTE — REVIEW OF SYSTEMS
[FreeTextEntry2] : appetite normalized and is alternating between three pounds up and down.  [FreeTextEntry5] : R PAC [de-identified] : right arm, and right hand, pt states from using chemicals. [FreeTextEntry1] : cats, melon

## 2020-05-18 ENCOUNTER — APPOINTMENT (OUTPATIENT)
Dept: HEMATOLOGY ONCOLOGY | Facility: CLINIC | Age: 35
End: 2020-05-18
Payer: COMMERCIAL

## 2020-05-18 VITALS
HEIGHT: 65.35 IN | HEART RATE: 77 BPM | OXYGEN SATURATION: 97 % | TEMPERATURE: 98.1 F | WEIGHT: 141 LBS | BODY MASS INDEX: 23.21 KG/M2 | SYSTOLIC BLOOD PRESSURE: 111 MMHG | RESPIRATION RATE: 18 BRPM | DIASTOLIC BLOOD PRESSURE: 60 MMHG

## 2020-05-18 PROCEDURE — 99213 OFFICE O/P EST LOW 20 MIN: CPT

## 2020-05-26 ENCOUNTER — APPOINTMENT (OUTPATIENT)
Dept: HEMATOLOGY ONCOLOGY | Facility: CLINIC | Age: 35
End: 2020-05-26
Payer: COMMERCIAL

## 2020-05-26 VITALS
WEIGHT: 140 LBS | SYSTOLIC BLOOD PRESSURE: 113 MMHG | BODY MASS INDEX: 23.04 KG/M2 | RESPIRATION RATE: 18 BRPM | HEART RATE: 69 BPM | DIASTOLIC BLOOD PRESSURE: 62 MMHG | HEIGHT: 65.35 IN | TEMPERATURE: 97.9 F | OXYGEN SATURATION: 98 %

## 2020-05-26 PROCEDURE — 99213 OFFICE O/P EST LOW 20 MIN: CPT

## 2020-06-01 ENCOUNTER — APPOINTMENT (OUTPATIENT)
Dept: HEMATOLOGY ONCOLOGY | Facility: CLINIC | Age: 35
End: 2020-06-01
Payer: COMMERCIAL

## 2020-06-01 VITALS
SYSTOLIC BLOOD PRESSURE: 117 MMHG | HEIGHT: 65.35 IN | HEART RATE: 81 BPM | DIASTOLIC BLOOD PRESSURE: 70 MMHG | RESPIRATION RATE: 18 BRPM | BODY MASS INDEX: 23.04 KG/M2 | TEMPERATURE: 97.7 F | WEIGHT: 140 LBS | OXYGEN SATURATION: 99 %

## 2020-06-01 PROCEDURE — 99214 OFFICE O/P EST MOD 30 MIN: CPT

## 2020-06-01 NOTE — REVIEW OF SYSTEMS
[Negative] : Heme/Lymph [Skin Rash] : no skin rash [FreeTextEntry5] : R PAC [FreeTextEntry1] : cats, melon

## 2020-06-01 NOTE — REASON FOR VISIT
[Follow-Up Visit] : a follow-up visit for [Lymphoma] : lymphoma [FreeTextEntry2] : Hodgkin's Lymphoma

## 2020-06-01 NOTE — PHYSICAL EXAM
[Fully active, able to carry on all pre-disease performance without restriction] : Status 0 - Fully active, able to carry on all pre-disease performance without restriction [Normal] : affect appropriate [de-identified] : Prior left axillary lymphadenopathy and 2 cm right inguinal lymph node unappreciable.

## 2020-06-01 NOTE — ASSESSMENT
[FreeTextEntry1] : This is a very pleasant 35-year-old gentleman with a newly diagnosed nodular sclerosing classic Hodgkin lymphoma.  Clinically at this point he appears to be at least stage IIIB given the findings of lymphadenopathy on both sides of diaphragm.  PET scan performed on 1/31/2020 revealed abnormal bilateral cervical FDG avid lymphadenopathy, abnormal FDG avid adenopathy seen at the thoracic inlet/supraclavicular region bilaterally, enlarged FDG avid left axillary lymphadenopathy with extensive mediastinal and bilateral hilar abnormal FDG avid lymphadenopathy, right femoral and inguinal abnormal FDG avid hypermetabolic lymph nodes.\par I also obtained a bone marrow biopsy to determine if there was bone marrow involvement.  I have discussed the possibility of infertility secondary to chemotherapy administration and have counseled him on sperm banking. He reports to me that he has  thought about it and is not interested in undergoing this.   I have spoken to Dr. Kitchen and he cleared him for bleomycin use.   He had also been cleared by cardiology for anthracycline use.  I have counseled him on smoking cessation and have strongly encouraged him to do this. We reviewed antiemetic use.  He may use Prochlorperazine prophylactically on days 3 and 4.  Repeat PET imaging after cycle #2 showed a significant response with Deauville scores of  2-3.  Therefore he proceeded on with 4 additional cycles with bleomycin being omitted with cycles 3 through 6.\par He continues to receive AVD and is tolerating it well. Cycle 4 day 15 is due tomorrow.\par PFT pending.\par Ordered CT Chest to follow nodule to be done in the near future.\par Office visit in 1 week or prn for new or worsening symptoms. \par

## 2020-06-01 NOTE — HISTORY OF PRESENT ILLNESS
[Therapy: ___] : Therapy: [unfilled] [Cycle: ___] : Cycle: [unfilled] [Day: ___] : Day: [unfilled] [0 - No Distress] : Distress Level: 0 [de-identified] : Mr. Reynolds presents with h/o Hodgkin's lymphoma, to complete cycle 4 next week.  He continues to tolerate therapy better since discontinuing Bleo with less abdominal discomfort and nausea. He has not had a return of his cough since cycle 1.  He is having a PFT in June.   He denies rash, fever, infections, bleeding, bruising, nausea,vomiting,cough, SOB, chest pain, change in BM, headache or dizziness.   [FreeTextEntry1] : ABVD , Bleo discontinued after cycle 2., AVD  cycle 4 day 15 next week. [de-identified] : This is a very pleasant 35-year-old gentleman with the below past medical history who was found to have clubbing and therefore was referred for pulmonary workup. He did report a 30 pound weight loss over the last 2 years. He has had waxing and waning lymph node enlargement particularly the left axilla and had been seen by another hematologist. Given the waxing and waning nature it was advised that this be observed. He was seen by Dr. Kitchen of pulmonary who felt that the above findings were consistent with a lymphoproliferative disorder. Arrangements were made for him to undergo a left axillary lymph node biopsy performed by Dr. Wallace on 1/24/2020. 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 The tumor cells were found to be positive for CD30, weakly positive for CD15, weakly positive for PAX-5, and Fascin. In situ hybridization probes for GRACE were negative. The biopsy sample tested negative for OCT-2, CD45, CD 79a, CD20, CD3, and BOB1. He is now here to f/u with labs after having started ABVD chemotherapy on 2/25/2020. Changed to AVD after C#2 due to PET response. \par

## 2020-06-08 ENCOUNTER — APPOINTMENT (OUTPATIENT)
Dept: HEMATOLOGY ONCOLOGY | Facility: CLINIC | Age: 35
End: 2020-06-08
Payer: COMMERCIAL

## 2020-06-08 VITALS
WEIGHT: 140 LBS | HEART RATE: 85 BPM | OXYGEN SATURATION: 99 % | TEMPERATURE: 98 F | BODY MASS INDEX: 23.04 KG/M2 | RESPIRATION RATE: 18 BRPM | SYSTOLIC BLOOD PRESSURE: 111 MMHG | HEIGHT: 65.35 IN | DIASTOLIC BLOOD PRESSURE: 64 MMHG

## 2020-06-08 PROCEDURE — 99213 OFFICE O/P EST LOW 20 MIN: CPT

## 2020-06-08 NOTE — ASSESSMENT
[FreeTextEntry1] : This is a very pleasant 35-year-old gentleman with a newly diagnosed nodular sclerosing classic Hodgkin lymphoma.  Clinically at this point he appears to be at least stage IIIB given the findings of lymphadenopathy on both sides of diaphragm.  PET scan performed on 1/31/2020 revealed abnormal bilateral cervical FDG avid lymphadenopathy, abnormal FDG avid adenopathy seen at the thoracic inlet/supraclavicular region bilaterally, enlarged FDG avid left axillary lymphadenopathy with extensive mediastinal and bilateral hilar abnormal FDG avid lymphadenopathy, right femoral and inguinal abnormal FDG avid hypermetabolic lymph nodes.\par I also obtained a bone marrow biopsy to determine if there was bone marrow involvement.  I have discussed the possibility of infertility secondary to chemotherapy administration and have counseled him on sperm banking. He reports to me that he has  thought about it and is not interested in undergoing this.   I have spoken to Dr. Kitchen and he cleared him for bleomycin use.   He had also been cleared by cardiology for anthracycline use.  I have counseled him on smoking cessation and have strongly encouraged him to do this. We reviewed antiemetic use.  He may use Prochlorperazine prophylactically on days 3 and 4.  Repeat PET imaging after cycle #2 showed a significant response with Deauville scores of  2-3.  Therefore he proceeded on with 4 additional cycles with bleomycin being omitted with cycles 3 through 6.\par He continues to receive AVD and is tolerating it well. Cycle 4 day 15 last week.\par PFT pending to be done in June.\par Pt to schedule CT Chest to follow nodules\par History of present illness, review of systems, physical exam and treatment plan reviewed with .\par Office visit in 1 week or prn for new or worsening symptoms. \par

## 2020-06-08 NOTE — HISTORY OF PRESENT ILLNESS
[Therapy: ___] : Therapy: [unfilled] [Cycle: ___] : Cycle: [unfilled] [Day: ___] : Day: [unfilled] [0 - No Distress] : Distress Level: 0 [FreeTextEntry1] : ABVD , Bleo discontinued after cycle 2., AVD  cycle 4 day 15 last week. [de-identified] : Mr. Reynolds presents with h/o Hodgkin's lymphoma, s/p cycle 4 last week.  He continues to tolerate therapy better since discontinuing Bleo with less abdominal discomfort and nausea. He has not had a return of his cough since cycle 1.  He is having a PFT in June.  He will be scheduled for follow up CT Chest with.  He denies rash, fever, infections, bleeding, bruising, nausea,vomiting,cough, SOB, chest pain, change in BM, headache or dizziness.   [de-identified] : This is a very pleasant 35-year-old gentleman with the below past medical history who was found to have clubbing and therefore was referred for pulmonary workup. He did report a 30 pound weight loss over the last 2 years. He has had waxing and waning lymph node enlargement particularly the left axilla and had been seen by another hematologist. Given the waxing and waning nature it was advised that this be observed. He was seen by Dr. Kitchen of pulmonary who felt that the above findings were consistent with a lymphoproliferative disorder. Arrangements were made for him to undergo a left axillary lymph node biopsy performed by Dr. Wallace on 1/24/2020. 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 The tumor cells were found to be positive for CD30, weakly positive for CD15, weakly positive for PAX-5, and Fascin. In situ hybridization probes for GRACE were negative. The biopsy sample tested negative for OCT-2, CD45, CD 79a, CD20, CD3, and BOB1. He is now here to f/u with labs after having started ABVD chemotherapy on 2/25/2020. Changed to AVD after C#2 due to PET response. \par

## 2020-06-08 NOTE — REVIEW OF SYSTEMS
[Negative] : Allergic/Immunologic [Skin Rash] : no skin rash [FreeTextEntry5] : R PAC [FreeTextEntry1] : cats, melon

## 2020-06-15 ENCOUNTER — APPOINTMENT (OUTPATIENT)
Dept: HEMATOLOGY ONCOLOGY | Facility: CLINIC | Age: 35
End: 2020-06-15
Payer: COMMERCIAL

## 2020-06-15 VITALS
BODY MASS INDEX: 23.56 KG/M2 | DIASTOLIC BLOOD PRESSURE: 73 MMHG | HEART RATE: 80 BPM | SYSTOLIC BLOOD PRESSURE: 123 MMHG | OXYGEN SATURATION: 99 % | WEIGHT: 141.4 LBS | HEIGHT: 65 IN | TEMPERATURE: 98.1 F

## 2020-06-15 PROCEDURE — 99214 OFFICE O/P EST MOD 30 MIN: CPT

## 2020-06-15 NOTE — ASSESSMENT
[FreeTextEntry1] : This is a very pleasant 35-year-old gentleman with a newly diagnosed nodular sclerosing classic Hodgkin lymphoma.  Clinically at this point he appears to be at least stage IIIB given the findings of lymphadenopathy on both sides of diaphragm.  PET scan performed on 1/31/2020 revealed abnormal bilateral cervical FDG avid lymphadenopathy, abnormal FDG avid adenopathy seen at the thoracic inlet/supraclavicular region bilaterally, enlarged FDG avid left axillary lymphadenopathy with extensive mediastinal and bilateral hilar abnormal FDG avid lymphadenopathy, right femoral and inguinal abnormal FDG avid hypermetabolic lymph nodes.\par I also obtained a bone marrow biopsy to determine if there was bone marrow involvement.  I have discussed the possibility of infertility secondary to chemotherapy administration and have counseled him on sperm banking. He reports to me that he has  thought about it and is not interested in undergoing this.   I have spoken to Dr. Kitchen and he cleared him for bleomycin use.   He had also been cleared by cardiology for anthracycline use.  I have counseled him on smoking cessation and have strongly encouraged him to do this. We reviewed antiemetic use.  He may use Prochlorperazine prophylactically on days 3 and 4.  Repeat PET imaging after cycle #2 showed a significant response with Deauville scores of  2-3.  Therefore he proceeded on with 4 additional cycles with bleomycin being omitted with cycles 3 through 6.\par He continues to receive AVD and is tolerating it well. Cycle 4 day 22 last week.\par PFT pending to be done in June.\par Repeat CT Chest on 6/10/20 showed decreased pulmonary infiltrates.\par Office visit in 1 week or prn for new or worsening symptoms. \par

## 2020-06-15 NOTE — HISTORY OF PRESENT ILLNESS
[Therapy: ___] : Therapy: [unfilled] [Cycle: ___] : Cycle: [unfilled] [Day: ___] : Day: [unfilled] [0 - No Distress] : Distress Level: 0 [FreeTextEntry1] : ABVD , Bleo discontinued after cycle 2., AVD  cycle 4 day 15 last week. [de-identified] : Mr. Reynolds presents with h/o Hodgkin's lymphoma, s/p cycle 4.  He continues to tolerate therapy better since discontinuing Bleo with less abdominal discomfort and nausea. He has not had a return of his cough since cycle 1. He denies rash, fever, infections, bleeding, bruising, nausea,vomiting,cough, SOB, chest pain, change in BM, headache or dizziness.   [de-identified] : This is a very pleasant 35-year-old gentleman with the below past medical history who was found to have clubbing and therefore was referred for pulmonary workup.  He did report a 30 pound weight loss over the last 2 years. He has had waxing and waning lymph node enlargement particularly the left axilla and had been seen by another hematologist. Given the waxing and waning nature it was advised that this be observed.  He was seen by Dr. Kitchen of pulmonary who felt that the above findings were consistent with a lymphoproliferative disorder. Arrangements were made for him to undergo a left axillary lymph node biopsy performed by Dr. Wallace on 1/24/2020. 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  The tumor cells were found to be positive for CD30, weakly positive for CD15, weakly positive for PAX-5, and Fascin.  In situ hybridization probes for GRACE were negative.  The biopsy sample tested negative for OCT-2, CD45, CD 79a, CD20, CD3, and BOB1.   He is now here to f/u with labs after having started ABVD chemotherapy on 2/25/2020.  Changed to AVD after C#2 due to PET response.

## 2020-06-22 ENCOUNTER — APPOINTMENT (OUTPATIENT)
Dept: HEMATOLOGY ONCOLOGY | Facility: CLINIC | Age: 35
End: 2020-06-22
Payer: COMMERCIAL

## 2020-06-22 VITALS
OXYGEN SATURATION: 97 % | TEMPERATURE: 97.7 F | HEIGHT: 63 IN | RESPIRATION RATE: 17 BRPM | HEART RATE: 82 BPM | BODY MASS INDEX: 24.75 KG/M2 | SYSTOLIC BLOOD PRESSURE: 114 MMHG | WEIGHT: 139.7 LBS | DIASTOLIC BLOOD PRESSURE: 78 MMHG

## 2020-06-22 PROCEDURE — 99214 OFFICE O/P EST MOD 30 MIN: CPT

## 2020-06-22 NOTE — ASSESSMENT
[FreeTextEntry1] : This is a very pleasant 35-year-old gentleman with a nodular sclerosing classic Hodgkin lymphoma.  Clinically at this point he appears to be at least stage IIIB given the findings of lymphadenopathy on both sides of diaphragm.  PET scan performed on 1/31/2020 revealed abnormal bilateral cervical FDG avid lymphadenopathy, abnormal FDG avid adenopathy seen at the thoracic inlet/supraclavicular region bilaterally, enlarged FDG avid left axillary lymphadenopathy with extensive mediastinal and bilateral hilar abnormal FDG avid lymphadenopathy, right femoral and inguinal abnormal FDG avid hypermetabolic lymph nodes.\par I also obtained a bone marrow biopsy to determine if there was bone marrow involvement.  I have discussed the possibility of infertility secondary to chemotherapy administration and have counseled him on sperm banking. He reports to me that he has  thought about it and is not interested in undergoing this.   I have spoken to Dr. Kitchen and he cleared him for bleomycin use.   He had also been cleared by cardiology for anthracycline use.  I have counseled him on smoking cessation and have strongly encouraged him to do this. We reviewed antiemetic use.  He may use Prochlorperazine prophylactically on days 3 and 4.  Repeat PET imaging after cycle #2 showed a significant response with Deauville scores of  2-3.  Therefore he proceeded on with 4 additional cycles with bleomycin being omitted with cycles 3 through 6.\par He continues to receive AVD and is tolerating it well. \par PFT pending to be done in June.\par Repeat CT Chest on 6/10/20 showed decreased pulmonary infiltrates.\par Office visit in 1 week or prn for new or worsening symptoms. \par

## 2020-06-22 NOTE — HISTORY OF PRESENT ILLNESS
[FreeTextEntry1] : ABVD , Bleo discontinued after cycle 2., AVD  cycle 4 day 15 last week. [de-identified] : This is a very pleasant 35-year-old gentleman with the below past medical history who was found to have clubbing and therefore was referred for pulmonary workup.  He did report a 30 pound weight loss over the last 2 years. He has had waxing and waning lymph node enlargement particularly the left axilla and had been seen by another hematologist. Given the waxing and waning nature it was advised that this be observed.  He was seen by Dr. Kitchen of pulmonary who felt that the above findings were consistent with a lymphoproliferative disorder. Arrangements were made for him to undergo a left axillary lymph node biopsy performed by Dr. Wallace on 1/24/2020. 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  The tumor cells were found to be positive for CD30, weakly positive for CD15, weakly positive for PAX-5, and Fascin.  In situ hybridization probes for GRACE were negative.  The biopsy sample tested negative for OCT-2, CD45, CD 79a, CD20, CD3, and BOB1.   He is now here to f/u with labs after having started ABVD chemotherapy on 2/25/2020.  Changed to AVD after C#2 due to PET response. [de-identified] : Mr. Reynolds presents with h/o Hodgkin's lymphoma, s/p cycle 4.  He continues to tolerate therapy better since discontinuing Bleo with less abdominal discomfort and nausea. He has not had a return of his cough since cycle 1. He denies rash, fever, infections, bleeding, bruising, nausea,vomiting,cough, SOB, chest pain, change in BM, headache or dizziness.

## 2020-06-29 ENCOUNTER — APPOINTMENT (OUTPATIENT)
Dept: HEMATOLOGY ONCOLOGY | Facility: CLINIC | Age: 35
End: 2020-06-29
Payer: COMMERCIAL

## 2020-06-29 VITALS
OXYGEN SATURATION: 98 % | HEIGHT: 63 IN | WEIGHT: 137 LBS | RESPIRATION RATE: 18 BRPM | TEMPERATURE: 98.3 F | HEART RATE: 90 BPM | DIASTOLIC BLOOD PRESSURE: 80 MMHG | SYSTOLIC BLOOD PRESSURE: 126 MMHG | BODY MASS INDEX: 24.27 KG/M2

## 2020-06-29 PROCEDURE — 99214 OFFICE O/P EST MOD 30 MIN: CPT

## 2020-06-29 NOTE — PHYSICAL EXAM
[Fully active, able to carry on all pre-disease performance without restriction] : Status 0 - Fully active, able to carry on all pre-disease performance without restriction [Restricted in physically strenuous activity but ambulatory and able to carry out work of a light or sedentary nature] : Status 1- Restricted in physically strenuous activity but ambulatory and able to carry out work of a light or sedentary nature, e.g., light house work, office work [Thin] : thin [Normal] : affect appropriate [de-identified] : small nodes in right axilla , per patient all nodes are sig improved

## 2020-06-29 NOTE — REASON FOR VISIT
[Lymphoma] : lymphoma [Follow-Up Visit] : a follow-up [FreeTextEntry2] : hodgkins lymphoma here for follow up

## 2020-06-29 NOTE — ASSESSMENT
[FreeTextEntry1] : This is a very pleasant 35-year-old gentleman with a nodular sclerosing classic Hodgkin lymphoma. Clinically at this point he appears to be at least stage IIIB given the findings of lymphadenopathy on both sides of diaphragm. PET scan performed on 1/31/2020 revealed abnormal bilateral cervical FDG avid lymphadenopathy, abnormal FDG avid adenopathy seen at the thoracic inlet/supraclavicular region bilaterally, enlarged FDG avid left axillary lymphadenopathy with extensive mediastinal and bilateral hilar abnormal FDG avid lymphadenopathy, right femoral and inguinal abnormal FDG avid hypermetabolic lymph nodes.\par I also obtained a bone marrow biopsy to determine if there was bone marrow involvement. I have discussed the possibility of infertility secondary to chemotherapy administration and have counseled him on sperm banking. He reports to me that he has thought about it and is not interested in undergoing this. I have spoken to Dr. Kitchen and he cleared him for bleomycin use. He had also been cleared by cardiology for anthracycline use. I have counseled him on smoking cessation and have strongly encouraged him to do this. We reviewed antiemetic use. He may use Prochlorperazine prophylactically on days 3 and 4. Repeat PET imaging after cycle #2 showed a significant response with Deauville scores of 2-3. Therefore he proceeded on with 4 additional cycles with bleomycin being omitted with cycles 3 through 6.\par He continues to receive AVD and is tolerating it well. \par PFT pending to be done in June.\par Repeat CT Chest on 6/10/20 showed decreased pulmonary infiltrates.\par \par 1) hodgkins lymphoma \par doing well \par tolerating AVD well \par having very good response , adenopathy sig better \par scans sig better repeat in june 2020 after complete chemo \par repeat cmp and ldh \par proceed with cycle 5 b \par \par 2) smoking \par advise patient to quit smoking \par \par 3) clubbing \par s/p ct chest \par s/p follow up wit dr franco \par plan for bubble study\par \par \par follow up tomorrow for chemo \par one week for office visit \par counts reviewd no need for gf support \par \par \par \par  \par

## 2020-06-29 NOTE — HISTORY OF PRESENT ILLNESS
[Therapy: ___] : Therapy: [unfilled] [Day: ___] : Day: [unfilled] [Cycle: ___] : Cycle: [unfilled] [0 - No Distress] : Distress Level: 0 [de-identified] : This is a very pleasant 35-year-old gentleman with the below past medical history who was found to have clubbing and therefore was referred for pulmonary workup. He did report a 30 pound weight loss over the last 2 years. He has had waxing and waning lymph node enlargement particularly the left axilla and had been seen by another hematologist. Given the waxing and waning nature it was advised that this be observed. He was seen by Dr. Kitchen of pulmonary who felt that the above findings were consistent with a lymphoproliferative disorder. Arrangements were made for him to undergo a left axillary lymph node biopsy performed by Dr. Wallace on 1/24/2020. 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 The tumor cells were found to be positive for CD30, weakly positive for CD15, weakly positive for PAX-5, and Fascin. In situ hybridization probes for GRACE were negative. The biopsy sample tested negative for OCT-2, CD45, CD 79a, CD20, CD3, and BOB1. He is now here to f/u with labs after having started ABVD chemotherapy on 2/25/2020. Changed to AVD after C#2 due to PET response.  [de-identified] : doing well , cont to tolerate AVD well \par no nausea  , no diarrhea or constipation \par no neuropathy ,\par no pain \par weight stable \par will have bubble study  per echo , saw pulmonary  [2 - Distress Level] : Distress Level: 2

## 2020-07-06 ENCOUNTER — APPOINTMENT (OUTPATIENT)
Dept: HEMATOLOGY ONCOLOGY | Facility: CLINIC | Age: 35
End: 2020-07-06
Payer: COMMERCIAL

## 2020-07-06 VITALS
HEIGHT: 63 IN | HEART RATE: 85 BPM | OXYGEN SATURATION: 98 % | SYSTOLIC BLOOD PRESSURE: 125 MMHG | WEIGHT: 137.38 LBS | RESPIRATION RATE: 18 BRPM | TEMPERATURE: 98 F | BODY MASS INDEX: 24.34 KG/M2 | DIASTOLIC BLOOD PRESSURE: 76 MMHG

## 2020-07-06 PROCEDURE — 99214 OFFICE O/P EST MOD 30 MIN: CPT

## 2020-07-06 NOTE — PHYSICAL EXAM
[Restricted in physically strenuous activity but ambulatory and able to carry out work of a light or sedentary nature] : Status 1- Restricted in physically strenuous activity but ambulatory and able to carry out work of a light or sedentary nature, e.g., light house work, office work [Thin] : thin [Normal] : grossly intact [de-identified] : all nodes are sig improved

## 2020-07-06 NOTE — ASSESSMENT
[FreeTextEntry1] : This is a very pleasant 35-year-old gentleman with a nodular sclerosing classic Hodgkin lymphoma. Clinically at this point he appears to be at least stage IIIB given the findings of lymphadenopathy on both sides of diaphragm. PET scan performed on 1/31/2020 revealed abnormal bilateral cervical FDG avid lymphadenopathy, abnormal FDG avid adenopathy seen at the thoracic inlet/supraclavicular region bilaterally, enlarged FDG avid left axillary lymphadenopathy with extensive mediastinal and bilateral hilar abnormal FDG avid lymphadenopathy, right femoral and inguinal abnormal FDG avid hypermetabolic lymph nodes.\par I also obtained a bone marrow biopsy to determine if there was bone marrow involvement. I have discussed the possibility of infertility secondary to chemotherapy administration and have counseled him on sperm banking. He reports to me that he has thought about it and is not interested in undergoing this. I have spoken to Dr. Kitchen and he cleared him for bleomycin use. He had also been cleared by cardiology for anthracycline use. I have counseled him on smoking cessation and have strongly encouraged him to do this. We reviewed antiemetic use. He may use Prochlorperazine prophylactically on days 3 and 4. Repeat PET imaging after cycle #2 showed a significant response with Deauville scores of 2-3. Therefore he proceeded on with 4 additional cycles with bleomycin being omitted with cycles 3 through 6.\par He continues to receive AVD and is tolerating it well. \par PFT pending to be done in June.\par Repeat CT Chest on 6/10/20 showed decreased pulmonary infiltrates.\par \par 1) hodgkins lymphoma \par doing well \par tolerating AVD well \par having very good response , adenopathy sig better \par scans sig better repeat in July 2020 after complete chemo \par repeat cmp and ldh \par proceed with cycle #6 next week.\par \par 2) smoking \par advise patient to quit smoking \par \par 3) clubbing \par s/p ct chest \par s/p follow up wit dr kitchen \par plan for bubble study\par \par one week for office visit.

## 2020-07-06 NOTE — HISTORY OF PRESENT ILLNESS
[2 - Distress Level] : Distress Level: 2 [de-identified] : This is a very pleasant 35-year-old gentleman with the below past medical history who was found to have clubbing and therefore was referred for pulmonary workup. He did report a 30 pound weight loss over the last 2 years. He has had waxing and waning lymph node enlargement particularly the left axilla and had been seen by another hematologist. Given the waxing and waning nature it was advised that this be observed. He was seen by Dr. Kitchen of pulmonary who felt that the above findings were consistent with a lymphoproliferative disorder. Arrangements were made for him to undergo a left axillary lymph node biopsy performed by Dr. Wallace on 1/24/2020. 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 The tumor cells were found to be positive for CD30, weakly positive for CD15, weakly positive for PAX-5, and Fascin. In situ hybridization probes for GRACE were negative. The biopsy sample tested negative for OCT-2, CD45, CD 79a, CD20, CD3, and BOB1. He is now here to f/u with labs after having started ABVD chemotherapy on 2/25/2020. Changed to AVD after C#2 due to PET response.  [de-identified] : doing well , cont to tolerate AVD well \par no nausea  , no diarrhea or constipation \par no neuropathy ,\par no pain \par weight stable \par will have bubble study  per echo , saw pulmonary

## 2020-07-13 ENCOUNTER — APPOINTMENT (OUTPATIENT)
Dept: HEMATOLOGY ONCOLOGY | Facility: CLINIC | Age: 35
End: 2020-07-13
Payer: COMMERCIAL

## 2020-07-13 VITALS
HEIGHT: 63 IN | DIASTOLIC BLOOD PRESSURE: 69 MMHG | RESPIRATION RATE: 20 BRPM | TEMPERATURE: 98.1 F | OXYGEN SATURATION: 97 % | SYSTOLIC BLOOD PRESSURE: 119 MMHG | HEART RATE: 91 BPM | BODY MASS INDEX: 24.51 KG/M2 | WEIGHT: 138.31 LBS

## 2020-07-13 PROCEDURE — 99214 OFFICE O/P EST MOD 30 MIN: CPT

## 2020-07-13 NOTE — ASSESSMENT
[FreeTextEntry1] : This is a very pleasant 35-year-old gentleman with a nodular sclerosing classic Hodgkin lymphoma. Clinically at this point he appears to be at least stage IIIB given the findings of lymphadenopathy on both sides of diaphragm. PET scan performed on 1/31/2020 revealed abnormal bilateral cervical FDG avid lymphadenopathy, abnormal FDG avid adenopathy seen at the thoracic inlet/supraclavicular region bilaterally, enlarged FDG avid left axillary lymphadenopathy with extensive mediastinal and bilateral hilar abnormal FDG avid lymphadenopathy, right femoral and inguinal abnormal FDG avid hypermetabolic lymph nodes.\par I also obtained a bone marrow biopsy to determine if there was bone marrow involvement. I have discussed the possibility of infertility secondary to chemotherapy administration and have counseled him on sperm banking. He reports to me that he has thought about it and is not interested in undergoing this. I have spoken to Dr. Kitchen and he cleared him for bleomycin use. He had also been cleared by cardiology for anthracycline use. I have counseled him on smoking cessation and have strongly encouraged him to do this. We reviewed antiemetic use. He may use Prochlorperazine prophylactically on days 3 and 4. Repeat PET imaging after cycle #2 showed a significant response with Deauville scores of 2-3. Therefore he proceeded on with 4 additional cycles with bleomycin being omitted with cycles 3 through 6.\par He continues to receive AVD and is tolerating it well. \par PFT pending to be done in June.\par Repeat CT Chest on 6/10/20 showed decreased pulmonary infiltrates.\par Proceed with cycle 6 AVD tomorrow, labs reviewed and adequate for chemo. Clinical response.  PET/CT 4/20 showed favorable with follow up CT [facility] 6/20 which showed continued improvement of mediastinal, right hilar and left axillary adenopathy and decreasing fight middle lobe infiltrate. Repeat imaging at the completion of chemo. \par  \par Smoking \par - discussed smoking cessation. He was given an handout on the smoking cessation program. \par \par History of present illness, review of systems, physical exam and treatment plan reviewed with .\par \par Office visit in 1 week or prn for new or worsening symptoms. \par \par \par

## 2020-07-13 NOTE — REVIEW OF SYSTEMS
[Negative] : Allergic/Immunologic [FreeTextEntry2] : mild fatigue but still working a .  [de-identified] : Neuropathy UE

## 2020-07-13 NOTE — HISTORY OF PRESENT ILLNESS
[2 - Distress Level] : Distress Level: 2 [de-identified] : This is a very pleasant 35-year-old gentleman with the below past medical history who was found to have clubbing and therefore was referred for pulmonary workup. He did report a 30 pound weight loss over the last 2 years. He has had waxing and waning lymph node enlargement particularly the left axilla and had been seen by another hematologist. Given the waxing and waning nature it was advised that this be observed. He was seen by Dr. Kitchen of pulmonary who felt that the above findings were consistent with a lymphoproliferative disorder. Arrangements were made for him to undergo a left axillary lymph node biopsy performed by Dr. Wallace on 1/24/2020. 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 The tumor cells were found to be positive for CD30, weakly positive for CD15, weakly positive for PAX-5, and Fascin. In situ hybridization probes for GRACE were negative. The biopsy sample tested negative for OCT-2, CD45, CD 79a, CD20, CD3, and BOB1. He is now here to f/u with labs after having started ABVD chemotherapy on 2/25/2020. Changed to AVD after C#2 due to PET response.  [de-identified] : He presents for cycle 6 AVD tomorrow.  he is tolerating therapy well with minimal neuropathy of his fingers. He has some fatigues days 3,4,5 but is able to work as a . \par He is scheduled for a bubble study in conjunction with echo. He continues to smoke.

## 2020-07-13 NOTE — PHYSICAL EXAM
[Restricted in physically strenuous activity but ambulatory and able to carry out work of a light or sedentary nature] : Status 1- Restricted in physically strenuous activity but ambulatory and able to carry out work of a light or sedentary nature, e.g., light house work, office work [Thin] : thin [Normal] : normal spine exam without palpable tenderness, no kyphosis or scoliosis [de-identified] : adenopathy axilla, significantly reduced

## 2020-07-20 ENCOUNTER — APPOINTMENT (OUTPATIENT)
Dept: HEMATOLOGY ONCOLOGY | Facility: CLINIC | Age: 35
End: 2020-07-20
Payer: COMMERCIAL

## 2020-07-20 VITALS
TEMPERATURE: 98 F | HEIGHT: 63 IN | RESPIRATION RATE: 18 BRPM | BODY MASS INDEX: 24.45 KG/M2 | HEART RATE: 69 BPM | WEIGHT: 138 LBS | DIASTOLIC BLOOD PRESSURE: 73 MMHG | OXYGEN SATURATION: 97 % | SYSTOLIC BLOOD PRESSURE: 119 MMHG

## 2020-07-20 PROCEDURE — 99214 OFFICE O/P EST MOD 30 MIN: CPT

## 2020-07-20 NOTE — HISTORY OF PRESENT ILLNESS
[2 - Distress Level] : Distress Level: 2 [de-identified] : He presents for cycle 6 AVD tomorrow.  he is tolerating therapy well with minimal neuropathy of his fingers. He has some fatigues days 3,4,5 but is able to work as a . \par He is scheduled for a bubble study in conjunction with echo. He continues to smoke.   [de-identified] : This is a very pleasant 35-year-old gentleman with the below past medical history who was found to have clubbing and therefore was referred for pulmonary workup. He did report a 30 pound weight loss over the last 2 years. He has had waxing and waning lymph node enlargement particularly the left axilla and had been seen by another hematologist. Given the waxing and waning nature it was advised that this be observed. He was seen by Dr. Kitchen of pulmonary who felt that the above findings were consistent with a lymphoproliferative disorder. Arrangements were made for him to undergo a left axillary lymph node biopsy performed by Dr. Wallace on 1/24/2020. 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 The tumor cells were found to be positive for CD30, weakly positive for CD15, weakly positive for PAX-5, and Fascin. In situ hybridization probes for GRACE were negative. The biopsy sample tested negative for OCT-2, CD45, CD 79a, CD20, CD3, and BOB1. He is now here to f/u with labs after having started ABVD chemotherapy on 2/25/2020. Changed to AVD after C#2 due to PET response.

## 2020-07-20 NOTE — REVIEW OF SYSTEMS
[Negative] : Neurological [FreeTextEntry2] : mild fatigue but still working a .  [de-identified] : Neuropathy UE

## 2020-07-20 NOTE — PHYSICAL EXAM
[Restricted in physically strenuous activity but ambulatory and able to carry out work of a light or sedentary nature] : Status 1- Restricted in physically strenuous activity but ambulatory and able to carry out work of a light or sedentary nature, e.g., light house work, office work [Thin] : thin [Normal] : affect appropriate [de-identified] : adenopathy axilla, significantly reduced

## 2020-07-27 ENCOUNTER — APPOINTMENT (OUTPATIENT)
Dept: HEMATOLOGY ONCOLOGY | Facility: CLINIC | Age: 35
End: 2020-07-27
Payer: COMMERCIAL

## 2020-07-27 VITALS
BODY MASS INDEX: 24.82 KG/M2 | HEART RATE: 72 BPM | RESPIRATION RATE: 20 BRPM | SYSTOLIC BLOOD PRESSURE: 117 MMHG | WEIGHT: 140.06 LBS | TEMPERATURE: 98.1 F | OXYGEN SATURATION: 97 % | DIASTOLIC BLOOD PRESSURE: 66 MMHG | HEIGHT: 63 IN

## 2020-07-27 PROCEDURE — 99213 OFFICE O/P EST LOW 20 MIN: CPT

## 2020-07-27 NOTE — REVIEW OF SYSTEMS
[Negative] : Allergic/Immunologic [de-identified] : Neuropathy UE [FreeTextEntry2] : mild fatigue but still working a .

## 2020-07-27 NOTE — PHYSICAL EXAM
[Restricted in physically strenuous activity but ambulatory and able to carry out work of a light or sedentary nature] : Status 1- Restricted in physically strenuous activity but ambulatory and able to carry out work of a light or sedentary nature, e.g., light house work, office work [Thin] : thin [Normal] : affect appropriate [de-identified] : adenopathy axilla, significantly reduced

## 2020-07-27 NOTE — HISTORY OF PRESENT ILLNESS
[2 - Distress Level] : Distress Level: 2 [Therapy: ___] : Therapy: [unfilled] [Cycle: ___] : Cycle: [unfilled] [Day: ___] : Day: [unfilled] [de-identified] : This is a very pleasant 35-year-old gentleman with the below past medical history who was found to have clubbing and therefore was referred for pulmonary workup. He did report a 30 pound weight loss over the last 2 years. He has had waxing and waning lymph node enlargement particularly the left axilla and had been seen by another hematologist. Given the waxing and waning nature it was advised that this be observed. He was seen by Dr. Kitchen of pulmonary who felt that the above findings were consistent with a lymphoproliferative disorder. Arrangements were made for him to undergo a left axillary lymph node biopsy performed by Dr. Wallace on 1/24/2020. 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 The tumor cells were found to be positive for CD30, weakly positive for CD15, weakly positive for PAX-5, and Fascin. In situ hybridization probes for GRACE were negative. The biopsy sample tested negative for OCT-2, CD45, CD 79a, CD20, CD3, and BOB1. He is now here to f/u with labs after having started ABVD chemotherapy on 2/25/2020. Changed to AVD after C#2 due to PET response.  [de-identified] : He presents for cycle 6 day 15 AVD tomorrow.  He continues to tolerate therapy well with minimal neuropathy of his fingers, and some fatigues days 3,4,5 but is able to work as a .  He continues to smoke.  \par

## 2020-07-27 NOTE — ASSESSMENT
[FreeTextEntry1] : This is a very pleasant 35-year-old gentleman with a nodular sclerosing classic Hodgkin lymphoma. Clinically at this point he appears to be at least stage IIIB given the findings of lymphadenopathy on both sides of diaphragm. PET scan performed on 1/31/2020 revealed abnormal bilateral cervical FDG avid lymphadenopathy, abnormal FDG avid adenopathy seen at the thoracic inlet/supraclavicular region bilaterally, enlarged FDG avid left axillary lymphadenopathy with extensive mediastinal and bilateral hilar abnormal FDG avid lymphadenopathy, right femoral and inguinal abnormal FDG avid hypermetabolic lymph nodes.\par I also obtained a bone marrow biopsy to determine if there was bone marrow involvement. I have discussed the possibility of infertility secondary to chemotherapy administration and have counseled him on sperm banking. He reports to me that he has thought about it and is not interested in undergoing this. I have spoken to Dr. Kitchen and he cleared him for bleomycin use. He had also been cleared by cardiology for anthracycline use. I have counseled him on smoking cessation and have strongly encouraged him to do this. We reviewed antiemetic use. He may use Prochlorperazine prophylactically on days 3 and 4. Repeat PET imaging after cycle #2 showed a significant response with Deauville scores of 2-3. Therefore he proceeded on with 4 additional cycles with bleomycin being omitted with cycles 3 through 6.\par He continues to receive AVD and is tolerating it well. \par PFT in June.\par Cycle 6  day 15 AVD tomorrow.  Labs reviewed and adequate for chemo.   PET/CT 4/20 showed favorable with follow up CT Chest 6/20 which showed continued improvement of mediastinal, right hilar and left axillary adenopathy and decreasing fight middle lobe infiltrate. Repeat imaging at the completion of chemo. \par  \par Smoking \par - discussed smoking cessation. He was given an handout on the smoking cessation program. \par \par History of present illness, review of systems, physical exam and treatment plan reviewed with .\par  \par Office visit in 1 week or prn for new or worsening symptoms. \par \par \par

## 2020-08-03 ENCOUNTER — APPOINTMENT (OUTPATIENT)
Dept: HEMATOLOGY ONCOLOGY | Facility: CLINIC | Age: 35
End: 2020-08-03
Payer: COMMERCIAL

## 2020-08-04 ENCOUNTER — APPOINTMENT (OUTPATIENT)
Dept: HEMATOLOGY ONCOLOGY | Facility: CLINIC | Age: 35
End: 2020-08-04
Payer: COMMERCIAL

## 2020-08-06 ENCOUNTER — APPOINTMENT (OUTPATIENT)
Dept: HEMATOLOGY ONCOLOGY | Facility: CLINIC | Age: 35
End: 2020-08-06
Payer: COMMERCIAL

## 2020-08-06 VITALS
BODY MASS INDEX: 24.98 KG/M2 | SYSTOLIC BLOOD PRESSURE: 126 MMHG | TEMPERATURE: 97.9 F | HEART RATE: 95 BPM | DIASTOLIC BLOOD PRESSURE: 81 MMHG | RESPIRATION RATE: 20 BRPM | WEIGHT: 141 LBS | OXYGEN SATURATION: 95 % | HEIGHT: 63 IN

## 2020-08-06 PROCEDURE — 99214 OFFICE O/P EST MOD 30 MIN: CPT

## 2020-08-07 NOTE — HISTORY OF PRESENT ILLNESS
[de-identified] : This is a very pleasant 35-year-old gentleman with the below past medical history who was found to have clubbing and therefore was referred for pulmonary workup. He did report a 30 pound weight loss over the last 2 years. He has had waxing and waning lymph node enlargement particularly the left axilla and had been seen by another hematologist. Given the waxing and waning nature it was advised that this be observed. He was seen by Dr. Kitchen of pulmonary who felt that the above findings were consistent with a lymphoproliferative disorder. Arrangements were made for him to undergo a left axillary lymph node biopsy performed by Dr. Wallace on 1/24/2020. 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 The tumor cells were found to be positive for CD30, weakly positive for CD15, weakly positive for PAX-5, and Fascin. In situ hybridization probes for GRACE were negative. The biopsy sample tested negative for OCT-2, CD45, CD 79a, CD20, CD3, and BOB1. He is now here to f/u with labs after having started ABVD chemotherapy on 2/25/2020. Changed to AVD after C#2 due to PET response.  [de-identified] : He presents after cycle 6 of AVD.  He continues to tolerate therapy well with minimal neuropathy of his fingers, and some fatigues days 3,4,5 but is able to work as a .  He continues to smoke.  \par

## 2020-08-07 NOTE — ASSESSMENT
[FreeTextEntry1] : This is a very pleasant 35-year-old gentleman with a nodular sclerosing classic Hodgkin lymphoma. Clinically at this point he appears to be at least stage IIIB given the findings of lymphadenopathy on both sides of diaphragm. PET scan performed on 1/31/2020 revealed abnormal bilateral cervical FDG avid lymphadenopathy, abnormal FDG avid adenopathy seen at the thoracic inlet/supraclavicular region bilaterally, enlarged FDG avid left axillary lymphadenopathy with extensive mediastinal and bilateral hilar abnormal FDG avid lymphadenopathy, right femoral and inguinal abnormal FDG avid hypermetabolic lymph nodes.\par I also obtained a bone marrow biopsy to determine if there was bone marrow involvement. I have discussed the possibility of infertility secondary to chemotherapy administration and have counseled him on sperm banking. He reports to me that he has thought about it and is not interested in undergoing this. I have spoken to Dr. Kitchen and he cleared him for bleomycin use. He had also been cleared by cardiology for anthracycline use. I have counseled him on smoking cessation and have strongly encouraged him to do this. We reviewed antiemetic use. He may use Prochlorperazine prophylactically on days 3 and 4. Repeat PET imaging after cycle #2 showed a significant response with Deauville scores of 2-3. Therefore he proceeded on with 4 additional cycles with bleomycin being omitted with cycles 3 through 6.\par He continues to receive AVD and is tolerating it well. \par PFT in June.\par Cycle 6 of AVD completed last week.  Labs reviewed and adequate.   PET/CT 4/20 showed favorable with follow up CT Chest 6/20 which showed continued improvement of mediastinal, right hilar and left axillary adenopathy and decreasing fight middle lobe infiltrate. Repeat imaging after the completion of chemo. \par  \par Smoking \par - discussed smoking cessation. He was given an handout on the smoking cessation program. \par  \par Office visit in 1 week or prn for new or worsening symptoms. \par \par \par

## 2020-08-12 ENCOUNTER — APPOINTMENT (OUTPATIENT)
Dept: HEMATOLOGY ONCOLOGY | Facility: CLINIC | Age: 35
End: 2020-08-12
Payer: COMMERCIAL

## 2020-08-12 VITALS
HEART RATE: 80 BPM | DIASTOLIC BLOOD PRESSURE: 76 MMHG | OXYGEN SATURATION: 98 % | TEMPERATURE: 98 F | BODY MASS INDEX: 25.55 KG/M2 | RESPIRATION RATE: 18 BRPM | SYSTOLIC BLOOD PRESSURE: 125 MMHG | WEIGHT: 144.19 LBS | HEIGHT: 63 IN

## 2020-08-12 PROCEDURE — 99213 OFFICE O/P EST LOW 20 MIN: CPT

## 2020-08-12 NOTE — ASSESSMENT
[FreeTextEntry1] : This is a very pleasant 35-year-old gentleman with a nodular sclerosing classic Hodgkin lymphoma. Clinically at this point he appears to be at least stage IIIB given the findings of lymphadenopathy on both sides of diaphragm. PET scan performed on 1/31/2020 revealed abnormal bilateral cervical FDG avid lymphadenopathy, abnormal FDG avid adenopathy seen at the thoracic inlet/supraclavicular region bilaterally, enlarged FDG avid left axillary lymphadenopathy with extensive mediastinal and bilateral hilar abnormal FDG avid lymphadenopathy, right femoral and inguinal abnormal FDG avid hypermetabolic lymph nodes.\par I also obtained a bone marrow biopsy to determine if there was bone marrow involvement. I have discussed the possibility of infertility secondary to chemotherapy administration and have counseled him on sperm banking. He reports to me that he has thought about it and is not interested in undergoing this. I have spoken to Dr. Kitchen and he cleared him for bleomycin use. He had also been cleared by cardiology for anthracycline use. I have counseled him on smoking cessation and have strongly encouraged him to do this. We reviewed antiemetic use. He may use Prochlorperazine prophylactically on days 3 and 4. Repeat PET imaging after cycle #2 showed a significant response with Deauville scores of 2-3. Therefore he proceeded on with 4 additional cycles with bleomycin being omitted with cycles 3 through 6.\par He continues to receive AVD and is tolerating it well. \par PFT in June.\par ast week.  Labs reviewed and adequate.   PET/CT 4/20 showed favorable with follow up CT Chest 6/20 which showed continued improvement of mediastinal, right hilar and left axillary adenopathy and decreasing fight middle lobe infiltrate. Repeat imaging after the completion of chemo. \par Cycle 6 of AVD completed two weeks ago.  He feels well clinically.  We will reimage/restage in a few weeks. \par Once again, discussed smoking cessation. He has been given a handout on the smoking cessation program.\par We discussed the neuropathy which does not prevent him from his work. \par Reviewed available labs.\par History of present illness, review of systems, physical exam and treatment plan reviewed with .\par Office visit in 2 week or prn for new or worsening symptoms. \par \par \par

## 2020-08-12 NOTE — REVIEW OF SYSTEMS
[Negative] : Allergic/Immunologic [FreeTextEntry2] : mild fatigue but still working a .  [de-identified] : Neuropathy UE

## 2020-08-12 NOTE — HISTORY OF PRESENT ILLNESS
[Therapy: ___] : Therapy: [unfilled] [Cycle: ___] : Cycle: [unfilled] [Day: ___] : Day: [unfilled] [2 - Distress Level] : Distress Level: 2 [de-identified] : He presents after cycle 6 of AVD, completed about two weeks ago. He generally feels well.  He denies rash, fever, infections, bleeding, bruising, nausea,vomiting,cough, SOB, chest pain, change in BM, headache or dizziness. [de-identified] : This is a very pleasant 35-year-old gentleman with the below past medical history who was found to have clubbing and therefore was referred for pulmonary workup. He did report a 30 pound weight loss over the last 2 years. He has had waxing and waning lymph node enlargement particularly the left axilla and had been seen by another hematologist. Given the waxing and waning nature it was advised that this be observed. He was seen by Dr. Kitchen of pulmonary who felt that the above findings were consistent with a lymphoproliferative disorder. Arrangements were made for him to undergo a left axillary lymph node biopsy performed by Dr. Wallace on 1/24/2020. 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 The tumor cells were found to be positive for CD30, weakly positive for CD15, weakly positive for PAX-5, and Fascin. In situ hybridization probes for GRACE were negative. The biopsy sample tested negative for OCT-2, CD45, CD 79a, CD20, CD3, and BOB1. He is now here to f/u with labs after having started ABVD chemotherapy on 2/25/2020. Changed to AVD after C#2 due to PET response.

## 2020-08-12 NOTE — PHYSICAL EXAM
[Restricted in physically strenuous activity but ambulatory and able to carry out work of a light or sedentary nature] : Status 1- Restricted in physically strenuous activity but ambulatory and able to carry out work of a light or sedentary nature, e.g., light house work, office work [Thin] : thin [Normal] : affect appropriate [de-identified] : adenopathy axilla, significantly reduced

## 2020-08-17 ENCOUNTER — APPOINTMENT (OUTPATIENT)
Dept: HEMATOLOGY ONCOLOGY | Facility: CLINIC | Age: 35
End: 2020-08-17

## 2020-08-24 ENCOUNTER — APPOINTMENT (OUTPATIENT)
Dept: HEMATOLOGY ONCOLOGY | Facility: CLINIC | Age: 35
End: 2020-08-24
Payer: COMMERCIAL

## 2020-08-24 VITALS
HEIGHT: 63 IN | BODY MASS INDEX: 25.6 KG/M2 | SYSTOLIC BLOOD PRESSURE: 125 MMHG | HEART RATE: 86 BPM | TEMPERATURE: 98.1 F | WEIGHT: 144.5 LBS | OXYGEN SATURATION: 96 % | RESPIRATION RATE: 18 BRPM | DIASTOLIC BLOOD PRESSURE: 67 MMHG

## 2020-08-24 PROCEDURE — 99214 OFFICE O/P EST MOD 30 MIN: CPT

## 2020-08-24 NOTE — HISTORY OF PRESENT ILLNESS
[Therapy: ___] : Therapy: [unfilled] [Cycle: ___] : Cycle: [unfilled] [Day: ___] : Day: [unfilled] [2 - Distress Level] : Distress Level: 2 [de-identified] : He presents after cycle 6 of AVD, completed about two weeks ago. He generally feels well.  He denies rash, fever, infections, bleeding, bruising, nausea,vomiting,cough, SOB, chest pain, change in BM, headache or dizziness. [de-identified] : This is a very pleasant 35-year-old gentleman with the below past medical history who was found to have clubbing and therefore was referred for pulmonary workup. He did report a 30 pound weight loss over the last 2 years. He has had waxing and waning lymph node enlargement particularly the left axilla and had been seen by another hematologist. Given the waxing and waning nature it was advised that this be observed. He was seen by Dr. Kitchen of pulmonary who felt that the above findings were consistent with a lymphoproliferative disorder. Arrangements were made for him to undergo a left axillary lymph node biopsy performed by Dr. Wallace on 1/24/2020. 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 The tumor cells were found to be positive for CD30, weakly positive for CD15, weakly positive for PAX-5, and Fascin. In situ hybridization probes for GRACE were negative. The biopsy sample tested negative for OCT-2, CD45, CD 79a, CD20, CD3, and BOB1. He is now here to f/u with labs after having started ABVD chemotherapy on 2/25/2020. Changed to AVD after C#2 due to PET response.

## 2020-08-24 NOTE — REVIEW OF SYSTEMS
[Negative] : Allergic/Immunologic [FreeTextEntry2] : mild fatigue but still working a .  [de-identified] : Neuropathy UE

## 2020-08-24 NOTE — ASSESSMENT
[FreeTextEntry1] : This is a very pleasant 35-year-old gentleman with a nodular sclerosing classic Hodgkin lymphoma. Clinically at this point he appears to be at least stage IIIB given the findings of lymphadenopathy on both sides of diaphragm. PET scan performed on 1/31/2020 revealed abnormal bilateral cervical FDG avid lymphadenopathy, abnormal FDG avid adenopathy seen at the thoracic inlet/supraclavicular region bilaterally, enlarged FDG avid left axillary lymphadenopathy with extensive mediastinal and bilateral hilar abnormal FDG avid lymphadenopathy, right femoral and inguinal abnormal FDG avid hypermetabolic lymph nodes.\par I also obtained a bone marrow biopsy to determine if there was bone marrow involvement. I have discussed the possibility of infertility secondary to chemotherapy administration and have counseled him on sperm banking. He reports to me that he has thought about it and is not interested in undergoing this. I have spoken to Dr. Kitchen and he cleared him for bleomycin use. He had also been cleared by cardiology for anthracycline use. I have counseled him on smoking cessation and have strongly encouraged him to do this. We reviewed antiemetic use. He may use Prochlorperazine prophylactically on days 3 and 4. Repeat PET imaging after cycle #2 showed a significant response with Deauville scores of 2-3. Therefore he proceeded on with 4 additional cycles with bleomycin being omitted with cycles 3 through 6.\par He continues to receive AVD and is tolerating it well. \par PFT in June.\par ast week.  Labs reviewed and adequate.   PET/CT 4/20 showed favorable with follow up CT Chest 6/20 which showed continued improvement of mediastinal, right hilar and left axillary adenopathy and decreasing fight middle lobe infiltrate. Repeat imaging after the completion of chemo. \par Cycle 6 of AVD completed 4 weeks ago.  He feels well clinically.  We will reimage/restage. \par Once again, discussed smoking cessation. He has been given a handout on the smoking cessation program.\par We discussed the neuropathy which does not prevent him from his work. \par Office visit in 2 week or prn for new or worsening symptoms. \par \par \par

## 2020-08-24 NOTE — PHYSICAL EXAM
[Restricted in physically strenuous activity but ambulatory and able to carry out work of a light or sedentary nature] : Status 1- Restricted in physically strenuous activity but ambulatory and able to carry out work of a light or sedentary nature, e.g., light house work, office work [Thin] : thin [Normal] : affect appropriate [de-identified] : adenopathy axilla, significantly reduced

## 2020-08-31 ENCOUNTER — APPOINTMENT (OUTPATIENT)
Dept: HEMATOLOGY ONCOLOGY | Facility: CLINIC | Age: 35
End: 2020-08-31

## 2020-09-09 ENCOUNTER — APPOINTMENT (OUTPATIENT)
Dept: HEMATOLOGY ONCOLOGY | Facility: CLINIC | Age: 35
End: 2020-09-09
Payer: COMMERCIAL

## 2020-09-09 VITALS
HEART RATE: 82 BPM | DIASTOLIC BLOOD PRESSURE: 66 MMHG | WEIGHT: 145.19 LBS | OXYGEN SATURATION: 98 % | HEIGHT: 63 IN | BODY MASS INDEX: 25.73 KG/M2 | SYSTOLIC BLOOD PRESSURE: 113 MMHG | TEMPERATURE: 98.1 F | RESPIRATION RATE: 18 BRPM

## 2020-09-09 PROCEDURE — 99213 OFFICE O/P EST LOW 20 MIN: CPT

## 2020-09-10 RX ORDER — ONDANSETRON 8 MG/1
8 TABLET, ORALLY DISINTEGRATING ORAL EVERY 8 HOURS
Qty: 45 | Refills: 2 | Status: DISCONTINUED | COMMUNITY
Start: 2020-02-14 | End: 2020-09-10

## 2020-09-10 RX ORDER — PROCHLORPERAZINE MALEATE 10 MG/1
10 TABLET ORAL EVERY 8 HOURS
Qty: 90 | Refills: 2 | Status: DISCONTINUED | COMMUNITY
Start: 2020-02-14 | End: 2020-09-10

## 2020-09-10 NOTE — PHYSICAL EXAM
[Thin] : thin [Fully active, able to carry on all pre-disease performance without restriction] : Status 0 - Fully active, able to carry on all pre-disease performance without restriction [Normal] : no peripheral adenopathy appreciated

## 2020-09-10 NOTE — REVIEW OF SYSTEMS
[Negative] : Allergic/Immunologic [FreeTextEntry2] : mild fatigue but still working a .  [de-identified] : Neuropathy UE improving

## 2020-09-10 NOTE — ASSESSMENT
[FreeTextEntry1] : This is a very pleasant 35-year-old gentleman with a nodular sclerosing classic Hodgkin lymphoma. Clinically at this point he appears to be at least stage IIIB given the findings of lymphadenopathy on both sides of diaphragm. PET scan performed on 1/31/2020 revealed abnormal bilateral cervical FDG avid lymphadenopathy, abnormal FDG avid adenopathy seen at the thoracic inlet/supraclavicular region bilaterally, enlarged FDG avid left axillary lymphadenopathy with extensive mediastinal and bilateral hilar abnormal FDG avid lymphadenopathy, right femoral and inguinal abnormal FDG avid hypermetabolic lymph nodes.\par I also obtained a bone marrow biopsy to determine if there was bone marrow involvement. I have discussed the possibility of infertility secondary to chemotherapy administration and have counseled him on sperm banking. He reports to me that he has thought about it and is not interested in undergoing this. I have spoken to Dr. Kitchen and he cleared him for bleomycin use. He had also been cleared by cardiology for anthracycline use. I have counseled him on smoking cessation and have strongly encouraged him to do this. We reviewed antiemetic use. He may use Prochlorperazine prophylactically on days 3 and 4. Repeat PET imaging after cycle #2 showed a significant response with Deauville scores of 2-3. Therefore he proceeded on with 4 additional cycles with bleomycin being omitted with cycles 3 through 6.\par He continues to receive AVD and is tolerating it well. \par PFT in June.\par ast week.  Labs reviewed and adequate.   PET/CT 4/20 showed favorable with follow up CT Chest 6/20 which showed continued improvement of mediastinal, right hilar and left axillary adenopathy and decreasing fight middle lobe infiltrate. Repeat imaging after the completion of chemo. \par Cycle 6 of AVD completed 6 weeks ago.  He feels well clinically.  He has not scheduled PET/CT as of yet and is urged to do so.  \par Once again, discussed smoking cessation. We once again advised him regarding smoking cessation and have previously given him a handout on the smoking cessation program.\par We discussed the neuropathy which is improving and does not prevent him from his work. We will continue to observe per his wishes.\par History of present illness, review of systems, physical exam and treatment plan reviewed with .\par Office visit in 2 week or prn for new or worsening symptoms. \par \par \par

## 2020-09-10 NOTE — HISTORY OF PRESENT ILLNESS
[Therapy: ___] : Therapy: [unfilled] [Cycle: ___] : Cycle: [unfilled] [2 - Distress Level] : Distress Level: 2 [de-identified] : He presents for follow up of [facility] after completing 6 cycles of AVD. He reports that his neuropathy is improving and he is able to do his work as an . He continues to smoke approximately 1 pack per day.  He denies rash, fever, infections, bleeding, bruising, nausea,vomiting,cough, SOB, chest pain, change in BM, headache or dizziness. [de-identified] : This is a very pleasant 35-year-old gentleman with the below past medical history who was found to have clubbing and therefore was referred for pulmonary workup. He did report a 30 pound weight loss over the last 2 years. He has had waxing and waning lymph node enlargement particularly the left axilla and had been seen by another hematologist. Given the waxing and waning nature it was advised that this be observed. He was seen by Dr. Kitchen of pulmonary who felt that the above findings were consistent with a lymphoproliferative disorder. Arrangements were made for him to undergo a left axillary lymph node biopsy performed by Dr. Wallace on 1/24/2020. 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 The tumor cells were found to be positive for CD30, weakly positive for CD15, weakly positive for PAX-5, and Fascin. In situ hybridization probes for GRACE were negative. The biopsy sample tested negative for OCT-2, CD45, CD 79a, CD20, CD3, and BOB1. He is now here to f/u with labs after having started ABVD chemotherapy on 2/25/2020. Changed to AVD after C#2 due to PET response.

## 2020-09-14 ENCOUNTER — APPOINTMENT (OUTPATIENT)
Dept: HEMATOLOGY ONCOLOGY | Facility: CLINIC | Age: 35
End: 2020-09-14

## 2020-09-17 ENCOUNTER — APPOINTMENT (OUTPATIENT)
Dept: HEMATOLOGY ONCOLOGY | Facility: CLINIC | Age: 35
End: 2020-09-17
Payer: COMMERCIAL

## 2020-09-17 VITALS
TEMPERATURE: 98.5 F | RESPIRATION RATE: 18 BRPM | OXYGEN SATURATION: 97 % | DIASTOLIC BLOOD PRESSURE: 76 MMHG | BODY MASS INDEX: 25.39 KG/M2 | HEIGHT: 63 IN | SYSTOLIC BLOOD PRESSURE: 120 MMHG | WEIGHT: 143.31 LBS | HEART RATE: 98 BPM

## 2020-09-17 PROCEDURE — 99214 OFFICE O/P EST MOD 30 MIN: CPT

## 2020-09-18 NOTE — HISTORY OF PRESENT ILLNESS
[de-identified] : This is a very pleasant 35-year-old gentleman with the below past medical history who was found to have clubbing and therefore was referred for pulmonary workup. He did report a 30 pound weight loss over the last 2 years. He has had waxing and waning lymph node enlargement particularly the left axilla and had been seen by another hematologist. Given the waxing and waning nature it was advised that this be observed. He was seen by Dr. Kitchen of pulmonary who felt that the above findings were consistent with a lymphoproliferative disorder. Arrangements were made for him to undergo a left axillary lymph node biopsy performed by Dr. Wallace on 1/24/2020. 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 The tumor cells were found to be positive for CD30, weakly positive for CD15, weakly positive for PAX-5, and Fascin. In situ hybridization probes for GRACE were negative. The biopsy sample tested negative for OCT-2, CD45, CD 79a, CD20, CD3, and BOB1. He is now here to f/u with labs after having started ABVD chemotherapy on 2/25/2020. Changed to AVD after C#2 due to PET response. \par \par  [de-identified] : He presents for follow up of [facility] after completing 6 cycles of AVD. He reports that his neuropathy is improving and he is able to do his work as an . He continues to smoke approximately 1 pack per day.  He denies rash, fever, infections, bleeding, bruising, nausea,vomiting,cough, SOB, chest pain, change in BM, headache or dizziness.

## 2020-09-18 NOTE — ASSESSMENT
[FreeTextEntry1] : This is a very pleasant 35-year-old gentleman with a nodular sclerosing classic Hodgkin lymphoma. Clinically at this point he appears to be at least stage IIIB given the findings of lymphadenopathy on both sides of diaphragm. PET scan performed on 1/31/2020 revealed abnormal bilateral cervical FDG avid lymphadenopathy, abnormal FDG avid adenopathy seen at the thoracic inlet/supraclavicular region bilaterally, enlarged FDG avid left axillary lymphadenopathy with extensive mediastinal and bilateral hilar abnormal FDG avid lymphadenopathy, right femoral and inguinal abnormal FDG avid hypermetabolic lymph nodes.\par I also obtained a bone marrow biopsy to determine if there was bone marrow involvement. I have discussed the possibility of infertility secondary to chemotherapy administration and have counseled him on sperm banking. He reports to me that he has thought about it and is not interested in undergoing this. I have spoken to Dr. Kitchen and he cleared him for bleomycin use. He had also been cleared by cardiology for anthracycline use. I have counseled him on smoking cessation and have strongly encouraged him to do this. We reviewed antiemetic use. He may use Prochlorperazine prophylactically on days 3 and 4. Repeat PET imaging after cycle #2 showed a significant response with Deauville scores of 2-3. Therefore he proceeded on with 4 additional cycles with bleomycin being omitted with cycles 3 through 6.\par He continues to receive AVD and is tolerating it well. \par PFT in June.\par ast week.  Labs reviewed and adequate.   PET/CT 4/20 showed favorable with follow up CT Chest 6/20 which showed continued improvement of mediastinal, right hilar and left axillary adenopathy and decreasing fight middle lobe infiltrate. \par Cycle 6 of AVD completed 2 months ago.  \par Repeat PET imaging on 9/11/20 showed resolution of all prior hypermetabolic activity and further decrease in size of the mediastinal LN's.  The was note of a small left pleural effusion.  These results are being forwarded to his pulmonologist Dr. Kitchen and the patient is instructed to call his office and set up f/u, with which he agrees.\par He will return in 6 weeks with a port flush then as well.\par \par

## 2020-09-25 ENCOUNTER — APPOINTMENT (OUTPATIENT)
Dept: HEMATOLOGY ONCOLOGY | Facility: CLINIC | Age: 35
End: 2020-09-25
Payer: COMMERCIAL

## 2020-09-25 VITALS
OXYGEN SATURATION: 98 % | TEMPERATURE: 98.2 F | DIASTOLIC BLOOD PRESSURE: 71 MMHG | HEART RATE: 80 BPM | WEIGHT: 142 LBS | SYSTOLIC BLOOD PRESSURE: 115 MMHG | RESPIRATION RATE: 18 BRPM | HEIGHT: 63 IN | BODY MASS INDEX: 25.16 KG/M2

## 2020-09-25 PROCEDURE — 99499A: CUSTOM | Mod: NC

## 2020-11-16 ENCOUNTER — APPOINTMENT (OUTPATIENT)
Dept: HEMATOLOGY ONCOLOGY | Facility: CLINIC | Age: 35
End: 2020-11-16
Payer: COMMERCIAL

## 2020-11-16 VITALS
HEIGHT: 63 IN | RESPIRATION RATE: 18 BRPM | OXYGEN SATURATION: 98 % | SYSTOLIC BLOOD PRESSURE: 136 MMHG | HEART RATE: 84 BPM | BODY MASS INDEX: 26.43 KG/M2 | WEIGHT: 149.19 LBS | TEMPERATURE: 98 F | DIASTOLIC BLOOD PRESSURE: 68 MMHG

## 2020-11-16 PROCEDURE — 99213 OFFICE O/P EST LOW 20 MIN: CPT

## 2020-11-25 NOTE — ASSESSMENT
[FreeTextEntry1] : This is a very pleasant 35-year-old gentleman with a nodular sclerosing classic Hodgkin lymphoma. Clinically at this point he appears to be at least stage IIIB given the findings of lymphadenopathy on both sides of diaphragm. PET scan performed on 1/31/2020 revealed abnormal bilateral cervical FDG avid lymphadenopathy, abnormal FDG avid adenopathy seen at the thoracic inlet/supraclavicular region bilaterally, enlarged FDG avid left axillary lymphadenopathy with extensive mediastinal and bilateral hilar abnormal FDG avid lymphadenopathy, right femoral and inguinal abnormal FDG avid hypermetabolic lymph nodes.\par I also obtained a bone marrow biopsy to determine if there was bone marrow involvement. I have discussed the possibility of infertility secondary to chemotherapy administration and have counseled him on sperm banking. He reports to me that he has thought about it and is not interested in undergoing this. I have spoken to Dr. Kitchen and he cleared him for bleomycin use. He had also been cleared by cardiology for anthracycline use. I have counseled him on smoking cessation and have strongly encouraged him to do this. We reviewed antiemetic use. He may use Prochlorperazine prophylactically on days 3 and 4. Repeat PET imaging after cycle #2 showed a significant response with Deauville scores of 2-3. Therefore he proceeded on with 4 additional cycles with bleomycin being omitted with cycles 3 through 6.\par He continues to receive AVD and is tolerating it well. \par PFT in June.\par ast week.  Labs reviewed and adequate.   PET/CT 4/20 showed favorable with follow up CT Chest 6/20 which showed continued improvement of mediastinal, right hilar and left axillary adenopathy and decreasing fight middle lobe infiltrate. \par Cycle 6 of AVD completed 2 months ago.  \par Repeat PET imaging on 9/11/20 showed resolution of all prior hypermetabolic activity and further decrease in size of the mediastinal LN's.  The was note of a small left pleural effusion.  These results are being forwarded to his pulmonologist Dr. Kitchen and the patient is instructed to call his office and set up f/u, with which he agrees.\par We discussed results of lab work which was normal\par We discussed neuropathy.  He wishes to continue to monitor/observe as it is improving.\par Continue routine, age-appropriate, healthcare maintenance\par Discussed smoking cessation.  referred to smoking cessation program, once again.\par History of present illness, review of systems, physical exam and treatment plan reviewed with .\par Office visit in 6 weeks or prn for new or worsening symptoms. \par \par \par

## 2020-11-25 NOTE — HISTORY OF PRESENT ILLNESS
[Therapy: ___] : Therapy: [unfilled] [Cycle: ___] : Cycle: [unfilled] [2 - Distress Level] : Distress Level: 2 [de-identified] : This is a very pleasant 35-year-old gentleman with the below past medical history who was found to have clubbing and therefore was referred for pulmonary workup. He did report a 30 pound weight loss over the last 2 years. He has had waxing and waning lymph node enlargement particularly the left axilla and had been seen by another hematologist. Given the waxing and waning nature it was advised that this be observed. He was seen by Dr. Kitchen of pulmonary who felt that the above findings were consistent with a lymphoproliferative disorder. Arrangements were made for him to undergo a left axillary lymph node biopsy performed by Dr. Wallace on 1/24/2020. 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 The tumor cells were found to be positive for CD30, weakly positive for CD15, weakly positive for PAX-5, and Fascin. In situ hybridization probes for GRACE were negative. The biopsy sample tested negative for OCT-2, CD45, CD 79a, CD20, CD3, and BOB1. He is now here to f/u with labs after having started ABVD chemotherapy on 2/25/2020. Changed to AVD after C#2 due to PET response. \par \par  [de-identified] : He presents for follow up of [facility] after completing 6 cycles of AVD. He reports that his neuropathy continues to improve and he is working as an . He continues to smoke approximately 1 pack per day.  He will be seeing his surgeon soon. He denies rash, fever, infections, bleeding, bruising, nausea,vomiting,cough, SOB, chest pain, change in BM, headache or dizziness.

## 2020-11-25 NOTE — REVIEW OF SYSTEMS
[Negative] : Allergic/Immunologic [FreeTextEntry2] : mild fatigue but still working a .  [de-identified] : Neuropathy UE improving

## 2020-11-25 NOTE — HISTORY OF PRESENT ILLNESS
[Therapy: ___] : Therapy: [unfilled] [Cycle: ___] : Cycle: [unfilled] [2 - Distress Level] : Distress Level: 2 [de-identified] : This is a very pleasant 35-year-old gentleman with the below past medical history who was found to have clubbing and therefore was referred for pulmonary workup. He did report a 30 pound weight loss over the last 2 years. He has had waxing and waning lymph node enlargement particularly the left axilla and had been seen by another hematologist. Given the waxing and waning nature it was advised that this be observed. He was seen by Dr. Kitchen of pulmonary who felt that the above findings were consistent with a lymphoproliferative disorder. Arrangements were made for him to undergo a left axillary lymph node biopsy performed by Dr. Wallace on 1/24/2020. 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 The tumor cells were found to be positive for CD30, weakly positive for CD15, weakly positive for PAX-5, and Fascin. In situ hybridization probes for GRACE were negative. The biopsy sample tested negative for OCT-2, CD45, CD 79a, CD20, CD3, and BOB1. He is now here to f/u with labs after having started ABVD chemotherapy on 2/25/2020. Changed to AVD after C#2 due to PET response. \par \par  [de-identified] : He presents for follow up of [facility] after completing 6 cycles of AVD. He reports that his neuropathy continues to improve and he is working as an . He continues to smoke approximately 1 pack per day.  He will be seeing his surgeon soon. He denies rash, fever, infections, bleeding, bruising, nausea,vomiting,cough, SOB, chest pain, change in BM, headache or dizziness.

## 2020-11-25 NOTE — REVIEW OF SYSTEMS
[Negative] : Allergic/Immunologic [FreeTextEntry2] : mild fatigue but still working a .  [de-identified] : Neuropathy UE improving

## 2020-12-28 ENCOUNTER — APPOINTMENT (OUTPATIENT)
Dept: HEMATOLOGY ONCOLOGY | Facility: CLINIC | Age: 35
End: 2020-12-28
Payer: COMMERCIAL

## 2021-01-04 NOTE — HISTORY OF PRESENT ILLNESS
[de-identified] : This is a very pleasant 35-year-old gentleman with the below past medical history who was found to have clubbing and therefore was referred for pulmonary workup. He did report a 30 pound weight loss over the last 2 years. He has had waxing and waning lymph node enlargement particularly the left axilla and had been seen by another hematologist. Given the waxing and waning nature it was advised that this be observed. He was seen by Dr. Kitchen of pulmonary who felt that the above findings were consistent with a lymphoproliferative disorder. Arrangements were made for him to undergo a left axillary lymph node biopsy performed by Dr. Wallace on 1/24/2020. 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 The tumor cells were found to be positive for CD30, weakly positive for CD15, weakly positive for PAX-5, and Fascin. In situ hybridization probes for GRACE were negative. The biopsy sample tested negative for OCT-2, CD45, CD 79a, CD20, CD3, and BOB1. He is now here to f/u with labs after having started ABVD chemotherapy on 2/25/2020. Changed to AVD after C#2 due to PET response. \par \par  [Therapy: ___] : Therapy: [unfilled] [Cycle: ___] : Cycle: [unfilled] [de-identified] : He presents for follow up of [facility] after completing 6 cycles of AVD. He reports that his neuropathy is improving and he is able to do his work as an . He continues to smoke approximately 1 pack per day.  He denies rash, fever, infections, bleeding, bruising, nausea,vomiting,cough, SOB, chest pain, change in BM, headache or dizziness. [2 - Distress Level] : Distress Level: 2

## 2021-01-04 NOTE — REVIEW OF SYSTEMS
[Negative] : Allergic/Immunologic [FreeTextEntry2] : mild fatigue but still working a .  [de-identified] : Neuropathy UE improving

## 2021-01-05 ENCOUNTER — APPOINTMENT (OUTPATIENT)
Dept: HEMATOLOGY ONCOLOGY | Facility: CLINIC | Age: 36
End: 2021-01-05
Payer: COMMERCIAL

## 2021-01-06 ENCOUNTER — APPOINTMENT (OUTPATIENT)
Dept: HEMATOLOGY ONCOLOGY | Facility: CLINIC | Age: 36
End: 2021-01-06
Payer: COMMERCIAL

## 2021-01-06 VITALS
WEIGHT: 147 LBS | TEMPERATURE: 98.4 F | DIASTOLIC BLOOD PRESSURE: 75 MMHG | HEIGHT: 63 IN | RESPIRATION RATE: 18 BRPM | SYSTOLIC BLOOD PRESSURE: 125 MMHG | HEART RATE: 80 BPM | BODY MASS INDEX: 26.05 KG/M2 | OXYGEN SATURATION: 97 %

## 2021-01-06 PROCEDURE — 99072 ADDL SUPL MATRL&STAF TM PHE: CPT

## 2021-01-06 PROCEDURE — 99214 OFFICE O/P EST MOD 30 MIN: CPT

## 2021-01-06 NOTE — REVIEW OF SYSTEMS
[Negative] : Neurological [FreeTextEntry2] : mild fatigue but still working a .  [de-identified] : Neuropathy UE improving

## 2021-01-06 NOTE — HISTORY OF PRESENT ILLNESS
[de-identified] : This is a very pleasant 35-year-old gentleman with the below past medical history who was found to have clubbing and therefore was referred for pulmonary workup. He did report a 30 pound weight loss over the last 2 years. He has had waxing and waning lymph node enlargement particularly the left axilla and had been seen by another hematologist. Given the waxing and waning nature it was advised that this be observed. He was seen by Dr. Kitchen of pulmonary who felt that the above findings were consistent with a lymphoproliferative disorder. Arrangements were made for him to undergo a left axillary lymph node biopsy performed by Dr. Wallace on 1/24/2020. 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 The tumor cells were found to be positive for CD30, weakly positive for CD15, weakly positive for PAX-5, and Fascin. In situ hybridization probes for GRACE were negative. The biopsy sample tested negative for OCT-2, CD45, CD 79a, CD20, CD3, and BOB1. He is now here to f/u with labs after having started ABVD chemotherapy on 2/25/2020. Changed to AVD after C#2 due to PET response. \par \par  [de-identified] : He presents for follow up of [facility] after completing 6 cycles of AVD. He reports that his neuropathy continues to improve and he is working as an . He continues to smoke approximately 1 pack per day.  He will be seeing his surgeon soon. He denies rash, fever, infections, bleeding, bruising, nausea,vomiting,cough, SOB, chest pain, change in BM, headache or dizziness.

## 2021-01-06 NOTE — ASSESSMENT
[FreeTextEntry1] : This is a very pleasant 35-year-old gentleman with a nodular sclerosing classic Hodgkin lymphoma. Clinically at this point he appears to be at least stage IIIB given the findings of lymphadenopathy on both sides of diaphragm. PET scan performed on 1/31/2020 revealed abnormal bilateral cervical FDG avid lymphadenopathy, abnormal FDG avid adenopathy seen at the thoracic inlet/supraclavicular region bilaterally, enlarged FDG avid left axillary lymphadenopathy with extensive mediastinal and bilateral hilar abnormal FDG avid lymphadenopathy, right femoral and inguinal abnormal FDG avid hypermetabolic lymph nodes.\par I also obtained a bone marrow biopsy to determine if there was bone marrow involvement. I have discussed the possibility of infertility secondary to chemotherapy administration and have counseled him on sperm banking. He reports to me that he has thought about it and is not interested in undergoing this. I have spoken to Dr. Kitchen and he cleared him for bleomycin use. He had also been cleared by cardiology for anthracycline use. I have counseled him on smoking cessation and have strongly encouraged him to do this. We reviewed antiemetic use. He may use Prochlorperazine prophylactically on days 3 and 4. Repeat PET imaging after cycle #2 showed a significant response with Deauville scores of 2-3. Therefore he proceeded on with 4 additional cycles with bleomycin being omitted with cycles 3 through 6.\par He continues to receive AVD and is tolerating it well. \par PFT in June.\par ast week.  Labs reviewed and adequate.   PET/CT 4/20 showed favorable with follow up CT Chest 6/20 which showed continued improvement of mediastinal, right hilar and left axillary adenopathy and decreasing fight middle lobe infiltrate. \par Cycle 6 of AVD completed 5 months ago.  \par Repeat PET imaging on 9/11/20 showed resolution of all prior hypermetabolic activity and further decrease in size of the mediastinal LN's.  The was note of a small left pleural effusion.  These results are being forwarded to his pulmonologist Dr. Kitchen and the patient is instructed to call his office and set up f/u, with which he agrees.\par Reimaging will be ordered at the time of the next office evaluation.\par We discussed results of lab work which was normal\par We discussed neuropathy.  He wishes to continue to monitor/observe as it is improving.\par Continue routine, age-appropriate, healthcare maintenance\par Discussed smoking cessation.  referred to smoking cessation program, once again.\par Office visit in 6 weeks or prn for new or worsening symptoms. \par \par \par

## 2021-02-12 LAB
ANA PAT FLD IF-IMP: ABNORMAL
ANA SER IF-ACNC: ABNORMAL
FOLATE SERPL-MCNC: 13.2 NG/ML
METHYLMALONATE SERPL-SCNC: 296 NMOL/L
VIT B12 SERPL-MCNC: 383 PG/ML

## 2021-02-16 ENCOUNTER — APPOINTMENT (OUTPATIENT)
Dept: HEMATOLOGY ONCOLOGY | Facility: CLINIC | Age: 36
End: 2021-02-16
Payer: COMMERCIAL

## 2021-03-01 ENCOUNTER — APPOINTMENT (OUTPATIENT)
Dept: HEMATOLOGY ONCOLOGY | Facility: CLINIC | Age: 36
End: 2021-03-01
Payer: COMMERCIAL

## 2021-03-01 VITALS
HEART RATE: 70 BPM | DIASTOLIC BLOOD PRESSURE: 73 MMHG | BODY MASS INDEX: 26.58 KG/M2 | OXYGEN SATURATION: 98 % | HEIGHT: 63 IN | WEIGHT: 150 LBS | RESPIRATION RATE: 18 BRPM | TEMPERATURE: 98.3 F | SYSTOLIC BLOOD PRESSURE: 135 MMHG

## 2021-03-01 PROCEDURE — 99072 ADDL SUPL MATRL&STAF TM PHE: CPT

## 2021-03-01 PROCEDURE — 99214 OFFICE O/P EST MOD 30 MIN: CPT

## 2021-03-01 NOTE — HISTORY OF PRESENT ILLNESS
[de-identified] : This is a very pleasant 35-year-old gentleman with the below past medical history who was found to have clubbing and therefore was referred for pulmonary workup. He did report a 30 pound weight loss over the last 2 years. He has had waxing and waning lymph node enlargement particularly the left axilla and had been seen by another hematologist. Given the waxing and waning nature it was advised that this be observed. He was seen by Dr. Kitchen of pulmonary who felt that the above findings were consistent with a lymphoproliferative disorder. Arrangements were made for him to undergo a left axillary lymph node biopsy performed by Dr. Wallace on 1/24/2020. 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 The tumor cells were found to be positive for CD30, weakly positive for CD15, weakly positive for PAX-5, and Fascin. In situ hybridization probes for GRACE were negative. The biopsy sample tested negative for OCT-2, CD45, CD 79a, CD20, CD3, and BOB1. He is now here to f/u with labs after having started ABVD chemotherapy on 2/25/2020. Changed to AVD after C#2 due to PET response. \par \par  [de-identified] : He presents for follow up of [facility] after completing 6 cycles of AVD. He reports that his neuropathy continues to improve and he is working as an . He continues to smoke approximately 1 pack per day.  He will be seeing his surgeon soon. He denies rash, fever, infections, bleeding, bruising, nausea,vomiting,cough, SOB, chest pain, change in BM, headache or dizziness.

## 2021-03-01 NOTE — ASSESSMENT
[FreeTextEntry1] : This is a very pleasant 35-year-old gentleman with a nodular sclerosing classic Hodgkin lymphoma. Clinically at this point he appears to be at least stage IIIB given the findings of lymphadenopathy on both sides of diaphragm. PET scan performed on 1/31/2020 revealed abnormal bilateral cervical FDG avid lymphadenopathy, abnormal FDG avid adenopathy seen at the thoracic inlet/supraclavicular region bilaterally, enlarged FDG avid left axillary lymphadenopathy with extensive mediastinal and bilateral hilar abnormal FDG avid lymphadenopathy, right femoral and inguinal abnormal FDG avid hypermetabolic lymph nodes.\par I also obtained a bone marrow biopsy to determine if there was bone marrow involvement. I have discussed the possibility of infertility secondary to chemotherapy administration and have counseled him on sperm banking. He reports to me that he has thought about it and is not interested in undergoing this. I have spoken to Dr. Kitchen and he cleared him for bleomycin use. He had also been cleared by cardiology for anthracycline use. I have counseled him on smoking cessation and have strongly encouraged him to do this. We reviewed antiemetic use. He may use Prochlorperazine prophylactically on days 3 and 4. Repeat PET imaging after cycle #2 showed a significant response with Deauville scores of 2-3. Therefore he proceeded on with 4 additional cycles with bleomycin being omitted with cycles 3 through 6.\par He continues to receive AVD and is tolerating it well. \par PFT in June.\par ast week.  Labs reviewed and adequate.   PET/CT 4/20 showed favorable with follow up CT Chest 6/20 which showed continued improvement of mediastinal, right hilar and left axillary adenopathy and decreasing fight middle lobe infiltrate. \par Cycle 6 of AVD completed 5 months ago.  \par Repeat PET imaging on 9/11/20 showed resolution of all prior hypermetabolic activity and further decrease in size of the mediastinal LN's.  The was note of a small left pleural effusion.  These results were forwarded to his pulmonologist Dr. Kitchen and the patient is instructed to call his office and set up f/u, with which he agrees.\par Reimaging will be ordered at the time of the next office evaluation.\par We discussed neuropathy.  He wishes to continue to monitor/observe as it is improving.\par Discussed smoking cessation.  referred to smoking cessation program, once again.\par Office visit in 3 weeks or prn for new or worsening symptoms. \par \par \par

## 2021-03-22 ENCOUNTER — APPOINTMENT (OUTPATIENT)
Dept: HEMATOLOGY ONCOLOGY | Facility: CLINIC | Age: 36
End: 2021-03-22
Payer: COMMERCIAL

## 2021-03-22 VITALS
TEMPERATURE: 98.1 F | HEIGHT: 63 IN | SYSTOLIC BLOOD PRESSURE: 122 MMHG | RESPIRATION RATE: 18 BRPM | DIASTOLIC BLOOD PRESSURE: 70 MMHG | BODY MASS INDEX: 27.11 KG/M2 | OXYGEN SATURATION: 98 % | WEIGHT: 153 LBS | HEART RATE: 79 BPM

## 2021-03-22 PROCEDURE — 99214 OFFICE O/P EST MOD 30 MIN: CPT

## 2021-03-22 PROCEDURE — 99072 ADDL SUPL MATRL&STAF TM PHE: CPT

## 2021-03-22 NOTE — HISTORY OF PRESENT ILLNESS
[Therapy: ___] : Therapy: [unfilled] [Cycle: ___] : Cycle: [unfilled] [2 - Distress Level] : Distress Level: 2 [de-identified] : This is a very pleasant 35-year-old gentleman with the below past medical history who was found to have clubbing and therefore was referred for pulmonary workup. He did report a 30 pound weight loss over the last 2 years. He has had waxing and waning lymph node enlargement particularly the left axilla and had been seen by another hematologist. Given the waxing and waning nature it was advised that this be observed. He was seen by Dr. Kitchen of pulmonary who felt that the above findings were consistent with a lymphoproliferative disorder. Arrangements were made for him to undergo a left axillary lymph node biopsy performed by Dr. Wallace on 1/24/2020. 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 The tumor cells were found to be positive for CD30, weakly positive for CD15, weakly positive for PAX-5, and Fascin. In situ hybridization probes for GRACE were negative. The biopsy sample tested negative for OCT-2, CD45, CD 79a, CD20, CD3, and BOB1. He is now here to f/u with labs after having started ABVD chemotherapy on 2/25/2020. Changed to AVD after C#2 due to PET response. \par \par  [de-identified] : He presents for follow up of [facility] after completing 6 cycles of AVD. He reports that his neuropathy continues to improve and he is working as an . He continues to smoke approximately 1 pack per day.  He will be seeing his surgeon soon. He denies rash, fever, infections, bleeding, bruising, nausea,vomiting,cough, SOB, chest pain, change in BM, headache or dizziness.  He recently underwent restaging scans and is here to review them.

## 2021-03-22 NOTE — REVIEW OF SYSTEMS
[Negative] : Allergic/Immunologic [FreeTextEntry2] : mild fatigue but still working a .  [de-identified] : Neuropathy UE improving

## 2021-03-22 NOTE — ASSESSMENT
[FreeTextEntry1] : This is a very pleasant 35-year-old gentleman with a nodular sclerosing classic Hodgkin lymphoma. Clinically at this point he appears to be at least stage IIIB given the findings of lymphadenopathy on both sides of diaphragm. PET scan performed on 1/31/2020 revealed abnormal bilateral cervical FDG avid lymphadenopathy, abnormal FDG avid adenopathy seen at the thoracic inlet/supraclavicular region bilaterally, enlarged FDG avid left axillary lymphadenopathy with extensive mediastinal and bilateral hilar abnormal FDG avid lymphadenopathy, right femoral and inguinal abnormal FDG avid hypermetabolic lymph nodes.\par I also obtained a bone marrow biopsy to determine if there was bone marrow involvement. I have discussed the possibility of infertility secondary to chemotherapy administration and have counseled him on sperm banking. He reports to me that he has thought about it and is not interested in undergoing this. I have spoken to Dr. Kitchen and he cleared him for bleomycin use. He had also been cleared by cardiology for anthracycline use. I have counseled him on smoking cessation and have strongly encouraged him to do this. We reviewed antiemetic use. He may use Prochlorperazine prophylactically on days 3 and 4. Repeat PET imaging after cycle #2 showed a significant response with Deauville scores of 2-3. Therefore he proceeded on with 4 additional cycles with bleomycin being omitted with cycles 3 through 6.\par He continues to receive AVD and is tolerating it well. \par PFT in June.\par ast week.  Labs reviewed and adequate.   PET/CT 4/20 showed favorable with follow up CT Chest 6/20 which showed continued improvement of mediastinal, right hilar and left axillary adenopathy and decreasing fight middle lobe infiltrate. \par Cycle 6 of AVD completed 5 months ago.  \par Repeat PET imaging on 9/11/20 showed resolution of all prior hypermetabolic activity and further decrease in size of the mediastinal LN's.  The was note of a small left pleural effusion.  These results were forwarded to his pulmonologist Dr. Kitchen and the patient is instructed to call his office and set up f/u, with which he agrees.\par Most recent CAT scan CAP reimaging on 3/12/2021 showed a continued decrease in size of his known lymphadenopathy, which was nonhypermetabolic on the last PET scan, as well as some persistent groundglass in the right middle lobe with resolution of some previously seen foci of groundglass.  The results of the scan will be forwarded to his pulmonologist and he knows to follow-up with him.\par We discussed neuropathy.  He wishes to continue to monitor/observe as it is improving.\par Discussed smoking cessation.  referred to smoking cessation program, once again.\par Office visit in 6 weeks or prn for new or worsening symptoms. \par \par \par

## 2021-05-03 ENCOUNTER — APPOINTMENT (OUTPATIENT)
Dept: HEMATOLOGY ONCOLOGY | Facility: CLINIC | Age: 36
End: 2021-05-03

## 2021-05-03 ENCOUNTER — NON-APPOINTMENT (OUTPATIENT)
Age: 36
End: 2021-05-03

## 2021-06-25 LAB
ANA PAT FLD IF-IMP: ABNORMAL
ANA SER IF-ACNC: ABNORMAL

## 2021-06-28 ENCOUNTER — APPOINTMENT (OUTPATIENT)
Dept: HEMATOLOGY ONCOLOGY | Facility: CLINIC | Age: 36
End: 2021-06-28
Payer: COMMERCIAL

## 2021-06-28 VITALS
DIASTOLIC BLOOD PRESSURE: 76 MMHG | SYSTOLIC BLOOD PRESSURE: 118 MMHG | BODY MASS INDEX: 27.64 KG/M2 | HEIGHT: 63 IN | HEART RATE: 81 BPM | RESPIRATION RATE: 18 BRPM | OXYGEN SATURATION: 98 % | WEIGHT: 156 LBS | TEMPERATURE: 98.4 F

## 2021-06-28 PROCEDURE — 99072 ADDL SUPL MATRL&STAF TM PHE: CPT

## 2021-06-28 PROCEDURE — 99214 OFFICE O/P EST MOD 30 MIN: CPT

## 2021-06-29 RX ORDER — LIDOCAINE AND PRILOCAINE 25; 25 MG/G; MG/G
2.5-2.5 CREAM TOPICAL
Qty: 2 | Refills: 2 | Status: DISCONTINUED | COMMUNITY
Start: 2020-02-14 | End: 2021-06-29

## 2021-06-29 NOTE — HISTORY OF PRESENT ILLNESS
[Therapy: ___] : Therapy: [unfilled] [Cycle: ___] : Cycle: [unfilled] [2 - Distress Level] : Distress Level: 2 [de-identified] : This is a very pleasant 36-year-old gentleman with the below past medical history who was found to have clubbing and therefore was referred for pulmonary workup. He did report a 30 pound weight loss over the last 2 years. He has had waxing and waning lymph node enlargement particularly the left axilla and had been seen by another hematologist. Given the waxing and waning nature it was advised that this be observed. He was seen by Dr. Kitchen of pulmonary who felt that the above findings were consistent with a lymphoproliferative disorder. Arrangements were made for him to undergo a left axillary lymph node biopsy performed by Dr. Wallace on 1/24/2020. 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 The tumor cells were found to be positive for CD30, weakly positive for CD15, weakly positive for PAX-5, and Fascin. In situ hybridization probes for GRACE were negative. The biopsy sample tested negative for OCT-2, CD45, CD 79a, CD20, CD3, and BOB1. He is now here to f/u with labs after having started ABVD chemotherapy on 2/25/2020. Changed to AVD after C#2 due to PET response. \par \par  [de-identified] : He presents for follow up of [facility] after completing 6 cycles of AVD. He reports that he is feeling quite well.  He has cut back on smoking cigarettes 1 to 2 cigarettes daily some days.  He had his MediPort removed.  He did not follow-up with pulmonology as recommended.  He denies rash, fever, infections, bleeding, bruising, nausea,vomiting,cough, SOB, chest pain, change in BM, headache or dizziness.

## 2021-06-29 NOTE — RESULTS/DATA
[FreeTextEntry1] : WBC 15.96\par Hemoglobin 14.9\par Hematocrit 44.4\par Platelet count 264\par Chemistry within normal limits

## 2021-06-29 NOTE — ASSESSMENT
[FreeTextEntry1] : This is a very pleasant 35-year-old gentleman with a nodular sclerosing classic Hodgkin lymphoma. Clinically at this point he appears to be at least stage IIIB given the findings of lymphadenopathy on both sides of diaphragm. PET scan performed on 1/31/2020 revealed abnormal bilateral cervical FDG avid lymphadenopathy, abnormal FDG avid adenopathy seen at the thoracic inlet/supraclavicular region bilaterally, enlarged FDG avid left axillary lymphadenopathy with extensive mediastinal and bilateral hilar abnormal FDG avid lymphadenopathy, right femoral and inguinal abnormal FDG avid hypermetabolic lymph nodes.\par I also obtained a bone marrow biopsy to determine if there was bone marrow involvement. I have discussed the possibility of infertility secondary to chemotherapy administration and have counseled him on sperm banking. He reports to me that he has thought about it and is not interested in undergoing this. I have spoken to Dr. Kitchen and he cleared him for bleomycin use. He had also been cleared by cardiology for anthracycline use. I have counseled him on smoking cessation and have strongly encouraged him to do this. We reviewed antiemetic use. He may use Prochlorperazine prophylactically on days 3 and 4. Repeat PET imaging after cycle #2 showed a significant response with Deauville scores of 2-3. Therefore he proceeded on with 4 additional cycles with bleomycin being omitted with cycles 3 through 6.\par He continues to receive AVD and is tolerating it well. \par PFT in June.\par ast week.  Labs reviewed and adequate.   PET/CT 4/20 showed favorable with follow up CT Chest 6/20 which showed continued improvement of mediastinal, right hilar and left axillary adenopathy and decreasing fight middle lobe infiltrate. \par Cycle 6 of AVD completed 5 months ago.  \par Repeat PET imaging on 9/11/20 showed resolution of all prior hypermetabolic activity and further decrease in size of the mediastinal LN's.  The was note of a small left pleural effusion.  These results were forwarded to his pulmonologist Dr. Kitchen and the patient is instructed to call his office and set up f/u, with which he agrees.\par Most recent CAT scan CAP reimaging on 3/12/2021 showed a continued decrease in size of his known lymphadenopathy, which was nonhypermetabolic on the last PET scan, as well as some persistent groundglass in the right middle lobe with resolution of some previously seen foci of groundglass.  The results of the scan will be forwarded to his pulmonologist and he knows to follow-up with him.\par He is urged once again to follow-up with pulmonology.\par Discussed smoking cessation.  Referred to smoking cessation program, once again\par Reviewed results of labs.  Leukocytosis/neutrophilia unchanged.\par Repeat scans 9/21\par Continue routine, age-appropriate, healthcare maintenance  \par History of present illness, review of systems, physical exam and treatment plan reviewed with . \par Office visit in 12 weeks or prn for new or worsening symptoms. \par \par \par

## 2021-09-13 ENCOUNTER — APPOINTMENT (OUTPATIENT)
Dept: HEMATOLOGY ONCOLOGY | Facility: CLINIC | Age: 36
End: 2021-09-13

## 2021-09-30 ENCOUNTER — APPOINTMENT (OUTPATIENT)
Dept: HEMATOLOGY ONCOLOGY | Facility: CLINIC | Age: 36
End: 2021-09-30
Payer: COMMERCIAL

## 2021-09-30 VITALS
SYSTOLIC BLOOD PRESSURE: 130 MMHG | RESPIRATION RATE: 18 BRPM | BODY MASS INDEX: 25.23 KG/M2 | WEIGHT: 157 LBS | HEIGHT: 66 IN | OXYGEN SATURATION: 98 % | DIASTOLIC BLOOD PRESSURE: 72 MMHG | HEART RATE: 74 BPM | TEMPERATURE: 98.6 F

## 2021-09-30 PROCEDURE — 99072 ADDL SUPL MATRL&STAF TM PHE: CPT

## 2021-09-30 PROCEDURE — 99214 OFFICE O/P EST MOD 30 MIN: CPT

## 2021-10-01 NOTE — ASSESSMENT
[FreeTextEntry1] : This is a very pleasant 35-year-old gentleman with a nodular sclerosing classic Hodgkin lymphoma. Clinically at this point he appears to be at least stage IIIB given the findings of lymphadenopathy on both sides of diaphragm. PET scan performed on 1/31/2020 revealed abnormal bilateral cervical FDG avid lymphadenopathy, abnormal FDG avid adenopathy seen at the thoracic inlet/supraclavicular region bilaterally, enlarged FDG avid left axillary lymphadenopathy with extensive mediastinal and bilateral hilar abnormal FDG avid lymphadenopathy, right femoral and inguinal abnormal FDG avid hypermetabolic lymph nodes.\par I also obtained a bone marrow biopsy to determine if there was bone marrow involvement. I have discussed the possibility of infertility secondary to chemotherapy administration and have counseled him on sperm banking. He reports to me that he has thought about it and is not interested in undergoing this. I have spoken to Dr. Kitchen and he cleared him for bleomycin use. He had also been cleared by cardiology for anthracycline use. I have counseled him on smoking cessation and have strongly encouraged him to do this. We reviewed antiemetic use. He may use Prochlorperazine prophylactically on days 3 and 4. Repeat PET imaging after cycle #2 showed a significant response with Deauville scores of 2-3. Therefore he proceeded on with 4 additional cycles with bleomycin being omitted with cycles 3 through 6.\par He continues to receive AVD and is tolerating it well. \par PFT in June.\par ast week.  Labs reviewed and adequate.   PET/CT 4/20 showed favorable with follow up CT Chest 6/20 which showed continued improvement of mediastinal, right hilar and left axillary adenopathy and decreasing fight middle lobe infiltrate. \par Cycle 6 of AVD completed 5 months ago.  \par Repeat PET imaging on 9/11/20 showed resolution of all prior hypermetabolic activity and further decrease in size of the mediastinal LN's.  The was note of a small left pleural effusion.  These results were forwarded to his pulmonologist Dr. Kitchen and the patient is instructed to call his office and set up f/u, with which he agrees.\par Most recent CAT scan CAP reimaging on 3/12/2021 showed a continued decrease in size of his known lymphadenopathy, which was nonhypermetabolic on the last PET scan, as well as some persistent groundglass in the right middle lobe with resolution of some previously seen foci of groundglass.  The results of the scan was forwarded to his pulmonologist and he knows to follow-up with him.\par He is urged once again to follow-up with pulmonology.\par Discussed smoking cessation.  Referred to smoking cessation program, once again\par Reviewed results of labs.  Leukocytosis/neutrophilia unchanged.\par Repeat scans ordered at today's visit.\par Continue routine, age-appropriate, healthcare maintenance  \par Office visit in 12 weeks or prn for new or worsening symptoms.

## 2021-10-01 NOTE — HISTORY OF PRESENT ILLNESS
[de-identified] : This is a very pleasant 36-year-old gentleman with the below past medical history who was found to have clubbing and therefore was referred for pulmonary workup. He did report a 30 pound weight loss over the last 2 years. He has had waxing and waning lymph node enlargement particularly the left axilla and had been seen by another hematologist. Given the waxing and waning nature it was advised that this be observed. He was seen by Dr. Kitchen of pulmonary who felt that the above findings were consistent with a lymphoproliferative disorder. Arrangements were made for him to undergo a left axillary lymph node biopsy performed by Dr. Wallace on 1/24/2020. 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 The tumor cells were found to be positive for CD30, weakly positive for CD15, weakly positive for PAX-5, and Fascin. In situ hybridization probes for GRACE were negative. The biopsy sample tested negative for OCT-2, CD45, CD 79a, CD20, CD3, and BOB1. He is now here to f/u with labs after having started ABVD chemotherapy on 2/25/2020. Changed to AVD after C#2 due to PET response. \par \par  [de-identified] : He presents for follow up of [facility] after completing 6 cycles of AVD. He reports that he is feeling quite well.  He has cut back on smoking cigarettes 1 to 2 cigarettes daily some days.  He had his MediPort removed.  He did not follow-up with pulmonology as recommended.  He denies rash, fever, infections, bleeding, bruising, nausea,vomiting,cough, SOB, chest pain, change in BM, headache or dizziness.

## 2021-11-17 ENCOUNTER — APPOINTMENT (OUTPATIENT)
Dept: HEMATOLOGY ONCOLOGY | Facility: CLINIC | Age: 36
End: 2021-11-17
Payer: COMMERCIAL

## 2021-12-13 ENCOUNTER — NON-APPOINTMENT (OUTPATIENT)
Age: 36
End: 2021-12-13

## 2021-12-13 ENCOUNTER — APPOINTMENT (OUTPATIENT)
Dept: HEMATOLOGY ONCOLOGY | Facility: CLINIC | Age: 36
End: 2021-12-13
Payer: COMMERCIAL

## 2021-12-23 ENCOUNTER — APPOINTMENT (OUTPATIENT)
Dept: HEMATOLOGY ONCOLOGY | Facility: CLINIC | Age: 36
End: 2021-12-23
Payer: COMMERCIAL

## 2021-12-23 VITALS
HEART RATE: 83 BPM | WEIGHT: 157 LBS | BODY MASS INDEX: 25.23 KG/M2 | TEMPERATURE: 98.6 F | RESPIRATION RATE: 18 BRPM | DIASTOLIC BLOOD PRESSURE: 72 MMHG | HEIGHT: 66 IN | OXYGEN SATURATION: 99 % | SYSTOLIC BLOOD PRESSURE: 148 MMHG

## 2021-12-23 PROCEDURE — 99214 OFFICE O/P EST MOD 30 MIN: CPT

## 2021-12-23 PROCEDURE — 99072 ADDL SUPL MATRL&STAF TM PHE: CPT

## 2021-12-23 NOTE — ASSESSMENT
[FreeTextEntry1] : This is a very pleasant 35-year-old gentleman with a nodular sclerosing classic Hodgkin lymphoma. Clinically at this point he appears to be at least stage IIIB given the findings of lymphadenopathy on both sides of diaphragm. PET scan performed on 1/31/2020 revealed abnormal bilateral cervical FDG avid lymphadenopathy, abnormal FDG avid adenopathy seen at the thoracic inlet/supraclavicular region bilaterally, enlarged FDG avid left axillary lymphadenopathy with extensive mediastinal and bilateral hilar abnormal FDG avid lymphadenopathy, right femoral and inguinal abnormal FDG avid hypermetabolic lymph nodes.\par I also obtained a bone marrow biopsy to determine if there was bone marrow involvement. I have discussed the possibility of infertility secondary to chemotherapy administration and have counseled him on sperm banking. He reports to me that he has thought about it and is not interested in undergoing this. I have spoken to Dr. Kitchen and he cleared him for bleomycin use. He had also been cleared by cardiology for anthracycline use. I have counseled him on smoking cessation and have strongly encouraged him to do this. We reviewed antiemetic use. He may use Prochlorperazine prophylactically on days 3 and 4. Repeat PET imaging after cycle #2 showed a significant response with Deauville scores of 2-3. Therefore he proceeded on with 4 additional cycles with bleomycin being omitted with cycles 3 through 6.\par He continues to receive AVD and is tolerating it well. \par PFT in June.\par  PET/CT 4/20 showed favorable response with follow up CT Chest 6/20 which showed continued improvement of mediastinal, right hilar and left axillary adenopathy and decreasing fight middle lobe infiltrate. \par Cycle 6 of AVD completed.  \par Repeat PET imaging on 9/11/20 showed resolution of all prior hypermetabolic activity and further decrease in size of the mediastinal LN's.  The was note of a small left pleural effusion.  These results were forwarded to his pulmonologist Dr. Kitchen.  Most recent CAT scan CAP reimaging on 12/21/2021 showed a continued decrease in size of his known lymphadenopathy, which was nonhypermetabolic on the last PET scan, as well as some persistent groundglass in the right middle lobe with resolution of some previously seen foci of groundglass.  The results of the scan was forwarded to his pulmonologist and he knows to follow-up with him.\par He is urged once again to follow-up with pulmonology.\par Discussed smoking cessation.  Referred to smoking cessation program, once again.\par Office visit in 12 weeks or prn for new or worsening symptoms.

## 2021-12-23 NOTE — HISTORY OF PRESENT ILLNESS
[de-identified] : This is a very pleasant 36-year-old gentleman with the below past medical history who was found to have clubbing and therefore was referred for pulmonary workup. He did report a 30 pound weight loss over the last 2 years. He has had waxing and waning lymph node enlargement particularly the left axilla and had been seen by another hematologist. Given the waxing and waning nature it was advised that this be observed. He was seen by Dr. Kitchen of pulmonary who felt that the above findings were consistent with a lymphoproliferative disorder. Arrangements were made for him to undergo a left axillary lymph node biopsy performed by Dr. Wallace on 1/24/2020. 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 The tumor cells were found to be positive for CD30, weakly positive for CD15, weakly positive for PAX-5, and Fascin. In situ hybridization probes for GRACE were negative. The biopsy sample tested negative for OCT-2, CD45, CD 79a, CD20, CD3, and BOB1. He is now here to f/u with labs after having started ABVD chemotherapy on 2/25/2020. Changed to AVD after C#2 due to PET response. \par \par  [de-identified] : He presents for follow up of [facility] after completing 6 cycles of AVD. He reports that he is feeling quite well.  He has cut back on smoking cigarettes 1 to 2 cigarettes daily some days.  He had his MediPort removed.  He did not follow-up with pulmonology as recommended.  He denies rash, fever, infections, bleeding, bruising, nausea,vomiting,cough, SOB, chest pain, change in BM, headache or dizziness.

## 2022-03-14 ENCOUNTER — APPOINTMENT (OUTPATIENT)
Dept: HEMATOLOGY ONCOLOGY | Facility: CLINIC | Age: 37
End: 2022-03-14
Payer: COMMERCIAL

## 2022-03-14 VITALS
HEIGHT: 66 IN | HEART RATE: 79 BPM | OXYGEN SATURATION: 100 % | RESPIRATION RATE: 18 BRPM | WEIGHT: 163 LBS | BODY MASS INDEX: 26.2 KG/M2 | SYSTOLIC BLOOD PRESSURE: 148 MMHG | TEMPERATURE: 98.2 F | DIASTOLIC BLOOD PRESSURE: 76 MMHG

## 2022-03-14 PROCEDURE — 36415 COLL VENOUS BLD VENIPUNCTURE: CPT

## 2022-03-14 PROCEDURE — 99214 OFFICE O/P EST MOD 30 MIN: CPT | Mod: 25

## 2022-03-14 NOTE — HISTORY OF PRESENT ILLNESS
[Therapy: ___] : Therapy: [unfilled] [Cycle: ___] : Cycle: [unfilled] [2 - Distress Level] : Distress Level: 2 [de-identified] : This is a very pleasant 37-year-old gentleman with the below past medical history who was found to have clubbing and therefore was referred for pulmonary workup. He did report a 30 pound weight loss over the last 2 years. He has had waxing and waning lymph node enlargement particularly the left axilla and had been seen by another hematologist. Given the waxing and waning nature it was advised that this be observed. He was seen by Dr. Kitchen of pulmonary who felt that the above findings were consistent with a lymphoproliferative disorder. Arrangements were made for him to undergo a left axillary lymph node biopsy performed by Dr. Wallace on 1/24/2020. 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 The tumor cells were found to be positive for CD30, weakly positive for CD15, weakly positive for PAX-5, and Fascin. In situ hybridization probes for GRACE were negative. The biopsy sample tested negative for OCT-2, CD45, CD 79a, CD20, CD3, and BOB1. He is now here to f/u with labs after having started ABVD chemotherapy on 2/25/2020. Changed to AVD after C#2 due to PET response. \par \par  [de-identified] : He presents for follow up of Sclerosing Hodgkin's Lymphoma after completing 6 cycles of AVD completed 7/28/20. 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  It does not interfere with function. He continues to smoke 1 to 2 cigarettes daily.   He had his MediPort removed.  He did not follow-up with pulmonology as recommended.  He denies rash, fever, infections, bleeding, bruising, nausea,vomiting,cough, SOB, chest pain, change in BM, headache or dizziness.

## 2022-03-14 NOTE — RESULTS/DATA
[FreeTextEntry1] : WBC 9.64\par Hemoglobin 16.3\par Hematocrit 49.2\par Platelet count 257,000\par Chemistry within normal limits

## 2022-03-14 NOTE — REVIEW OF SYSTEMS
[Negative] : Allergic/Immunologic [Recent Change In Weight] : ~T recent weight change [FreeTextEntry2] : + 6 lbs. [de-identified] : Mild neuropathy upper extremities.

## 2022-06-27 PROBLEM — G62.9 PERIPHERAL NEUROPATHY: Status: ACTIVE | Noted: 2020-07-13

## 2022-06-27 PROBLEM — F17.200 SMOKING: Status: ACTIVE | Noted: 2020-02-12

## 2022-06-29 ENCOUNTER — APPOINTMENT (OUTPATIENT)
Dept: HEMATOLOGY ONCOLOGY | Facility: CLINIC | Age: 37
End: 2022-06-29
Payer: COMMERCIAL

## 2022-06-29 VITALS
HEART RATE: 88 BPM | WEIGHT: 162 LBS | SYSTOLIC BLOOD PRESSURE: 121 MMHG | BODY MASS INDEX: 26.03 KG/M2 | RESPIRATION RATE: 18 BRPM | TEMPERATURE: 98.5 F | OXYGEN SATURATION: 99 % | HEIGHT: 66 IN | DIASTOLIC BLOOD PRESSURE: 78 MMHG

## 2022-06-29 DIAGNOSIS — G62.9 POLYNEUROPATHY, UNSPECIFIED: ICD-10-CM

## 2022-06-29 DIAGNOSIS — F17.200 NICOTINE DEPENDENCE, UNSPECIFIED, UNCOMPLICATED: ICD-10-CM

## 2022-06-29 PROCEDURE — 99214 OFFICE O/P EST MOD 30 MIN: CPT

## 2022-06-29 NOTE — ASSESSMENT
[FreeTextEntry1] : This is a very pleasant 35-year-old gentleman with a nodular sclerosing classic Hodgkin lymphoma. Clinically at this point he appears to be at least stage IIIB given the findings of lymphadenopathy on both sides of diaphragm. PET scan performed on 1/31/2020 revealed abnormal bilateral cervical FDG avid lymphadenopathy, abnormal FDG avid adenopathy seen at the thoracic inlet/supraclavicular region bilaterally, enlarged FDG avid left axillary lymphadenopathy with extensive mediastinal and bilateral hilar abnormal FDG avid lymphadenopathy, right femoral and inguinal abnormal FDG avid hypermetabolic lymph nodes.\par I also obtained a bone marrow biopsy to determine if there was bone marrow involvement. I have discussed the possibility of infertility secondary to chemotherapy administration and have counseled him on sperm banking. He reports to me that he has thought about it and is not interested in undergoing this. I have spoken to Dr. Kitchen and he cleared him for bleomycin use. He had also been cleared by cardiology for anthracycline use. I have counseled him on smoking cessation and have strongly encouraged him to do this. We reviewed antiemetic use. He may use Prochlorperazine prophylactically on days 3 and 4. Repeat PET imaging after cycle #2 showed a significant response with Deauville scores of 2-3. Therefore he proceeded on with 4 additional cycles with bleomycin being omitted with cycles 3 through 6.\par PET/CT 4/20 showed favorable response with follow up CT Chest 6/20 which showed continued improvement of mediastinal, right hilar and left axillary adenopathy and decreasing fight middle lobe infiltrate. \par Repeat PET imaging on 9/11/20 showed resolution of all prior hypermetabolic activity and further decrease in size of the mediastinal LN's.  The was note of a small left pleural effusion.  These results were forwarded to his pulmonologist Dr. Kitchen.  Most recent CAT scan CAP reimaging on 12/21/2021 showed a continued decrease in size of his known lymphadenopathy, which was nonhypermetabolic on the last PET scan, as well as some persistent groundglass in the right middle lobe with resolution of some previously seen foci of groundglass.  The results of the scan was forwarded to his pulmonologist and he knows to follow-up with him.\par He is urged once again to follow-up with pulmonology.\par Discussed smoking cessation.  Referred to smoking cessation program, once again.\par He will take B vitamin complex to see if it helps with neuropathy.  If no improvement he will consider referral to neurology. \par Restaging scans will be ordered at this visit.\par Office visit in 12 weeks or prn for new or worsening symptoms.

## 2022-06-29 NOTE — HISTORY OF PRESENT ILLNESS
[Therapy: ___] : Therapy: [unfilled] [Cycle: ___] : Cycle: [unfilled] [2 - Distress Level] : Distress Level: 2 [de-identified] : This is a very pleasant 37-year-old gentleman with the below past medical history who was found to have clubbing and therefore was referred for pulmonary workup. He did report a 30 pound weight loss over the last 2 years. He has had waxing and waning lymph node enlargement particularly the left axilla and had been seen by another hematologist. Given the waxing and waning nature it was advised that this be observed. He was seen by Dr. Kitchen of pulmonary who felt that the above findings were consistent with a lymphoproliferative disorder. Arrangements were made for him to undergo a left axillary lymph node biopsy performed by Dr. Wallace on 1/24/2020. 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 The tumor cells were found to be positive for CD30, weakly positive for CD15, weakly positive for PAX-5, and Fascin. In situ hybridization probes for GRACE were negative. The biopsy sample tested negative for OCT-2, CD45, CD 79a, CD20, CD3, and BOB1. He is now here to f/u with labs after having started ABVD chemotherapy on 2/25/2020. Changed to AVD after C#2 due to PET response. \par \par  [de-identified] : He presents for follow up of Sclerosing Hodgkin's Lymphoma after completing 6 cycles of AVD completed 7/28/20. 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  It does not interfere with function. He continues to smoke 1 to 2 cigarettes daily.   He had his MediPort removed.  He did not follow-up with pulmonology as recommended.  He denies rash, fever, infections, bleeding, bruising, nausea,vomiting,cough, SOB, chest pain, change in BM, headache or dizziness.

## 2022-06-29 NOTE — REVIEW OF SYSTEMS
[Recent Change In Weight] : ~T recent weight change [Negative] : Neurological [FreeTextEntry2] : + 6 lbs. [de-identified] : Mild neuropathy upper extremities.

## 2022-08-11 ENCOUNTER — APPOINTMENT (OUTPATIENT)
Dept: HEMATOLOGY ONCOLOGY | Facility: CLINIC | Age: 37
End: 2022-08-11

## 2022-10-07 ENCOUNTER — APPOINTMENT (OUTPATIENT)
Dept: HEMATOLOGY ONCOLOGY | Facility: CLINIC | Age: 37
End: 2022-10-07

## 2022-10-07 VITALS
OXYGEN SATURATION: 99 % | WEIGHT: 169 LBS | DIASTOLIC BLOOD PRESSURE: 83 MMHG | RESPIRATION RATE: 18 BRPM | HEART RATE: 87 BPM | HEIGHT: 66 IN | SYSTOLIC BLOOD PRESSURE: 132 MMHG | BODY MASS INDEX: 27.16 KG/M2 | TEMPERATURE: 98.9 F

## 2022-10-07 PROCEDURE — 99214 OFFICE O/P EST MOD 30 MIN: CPT | Mod: 25

## 2022-10-07 PROCEDURE — 36415 COLL VENOUS BLD VENIPUNCTURE: CPT

## 2022-10-08 NOTE — HISTORY OF PRESENT ILLNESS
[Therapy: ___] : Therapy: [unfilled] [Cycle: ___] : Cycle: [unfilled] [2 - Distress Level] : Distress Level: 2 [de-identified] : 37-year-old gentleman with the below past medical history who was found to have clubbing and therefore was referred for pulmonary workup. He did report a 30 pound weight loss over the last 2 years. He has had waxing and waning lymph node enlargement particularly the left axilla and had been seen by another hematologist. \par  The findings were consistent with a classic nodular sclerosing Hodgkin lymphoma. The tumor cells were found to be positive for CD30, weakly positive for CD15, weakly positive for PAX-5, and Fascin. In situ hybridization probes for GRACE were negative. The biopsy sample tested negative for OCT-2, CD45, CD 79a, CD20, CD3, and BOB1.\par Started ABVD 2/25/20. Switched to AVD [id number]\par \par

## 2022-10-08 NOTE — REVIEW OF SYSTEMS
[Negative] : Allergic/Immunologic [Recent Change In Weight] : ~T recent weight change [FreeTextEntry2] : + 6 lbs. [de-identified] : Mild neuropathy upper extremities.

## 2022-10-08 NOTE — ASSESSMENT
[FreeTextEntry1] : 37-year-old gentleman with a nodular sclerosing classic Hodgkin lymphoma. Clinically at this point he appears to be at least stage IIIB given the findings of lymphadenopathy on both sides of diaphragm. PET scan performed on 1/31/2020 revealed abnormal bilateral cervical FDG avid lymphadenopathy, abnormal FDG avid adenopathy seen at the thoracic inlet/supraclavicular region bilaterally, enlarged FDG avid left axillary lymphadenopathy with extensive mediastinal and bilateral hilar abnormal FDG avid lymphadenopathy, right femoral and inguinal abnormal FDG avid hypermetabolic lymph nodes.\par s/p ABVD 2/5/20 to 7/25/20/ Switched to AVD after 2 cycles\par Repeat PET imaging on 9/11/20 showed resolution of all prior hypermetabolic activity and further decrease in size of the mediastinal LN's.  \par \par Plan:\par Repeat PETCT\par RTC 6weeks

## 2022-12-21 DIAGNOSIS — C81.18 NODULAR SCLEROSIS HODGKIN LYMPHOMA, LYMPH NODES OF MULTIPLE SITES: ICD-10-CM

## 2022-12-22 ENCOUNTER — APPOINTMENT (OUTPATIENT)
Dept: HEMATOLOGY ONCOLOGY | Facility: CLINIC | Age: 37
End: 2022-12-22

## 2025-06-10 NOTE — ASSESSMENT
[phone]: dermatology  [phone] General surgery     [FreeTextEntry1] : This is a very pleasant 35-year-old gentleman with a nodular sclerosing classic Hodgkin lymphoma. Clinically at this point he appears to be at least stage IIIB given the findings of lymphadenopathy on both sides of diaphragm. PET scan performed on 1/31/2020 revealed abnormal bilateral cervical FDG avid lymphadenopathy, abnormal FDG avid adenopathy seen at the thoracic inlet/supraclavicular region bilaterally, enlarged FDG avid left axillary lymphadenopathy with extensive mediastinal and bilateral hilar abnormal FDG avid lymphadenopathy, right femoral and inguinal abnormal FDG avid hypermetabolic lymph nodes.\par I also obtained a bone marrow biopsy to determine if there was bone marrow involvement. I have discussed the possibility of infertility secondary to chemotherapy administration and have counseled him on sperm banking. He reports to me that he has thought about it and is not interested in undergoing this. I have spoken to Dr. Kitchen and he cleared him for bleomycin use. He had also been cleared by cardiology for anthracycline use. I have counseled him on smoking cessation and have strongly encouraged him to do this. We reviewed antiemetic use. He may use Prochlorperazine prophylactically on days 3 and 4. Repeat PET imaging after cycle #2 showed a significant response with Deauville scores of 2-3. Therefore he proceeded on with 4 additional cycles with bleomycin being omitted with cycles 3 through 6.\par He continues to receive AVD and is tolerating it well. \par PFT pending to be done in June.\par Repeat CT Chest on 6/10/20 showed decreased pulmonary infiltrates.\par cycle 6 AVD started last week.  labs reviewed. Clinical response.  PET/CT 4/20 showed favorable with follow up CT [facility] 6/20 which showed continued improvement of mediastinal, right hilar and left axillary adenopathy and decreasing fight middle lobe infiltrate. Repeat imaging at the completion of chemo. \par  \par Smoking \par - discussed smoking cessation. He was given an handout on the smoking cessation program. \par Office visit in 1 week or prn for new or worsening symptoms. \par \par \par